# Patient Record
Sex: FEMALE | Race: OTHER | Employment: UNEMPLOYED | URBAN - METROPOLITAN AREA
[De-identification: names, ages, dates, MRNs, and addresses within clinical notes are randomized per-mention and may not be internally consistent; named-entity substitution may affect disease eponyms.]

---

## 2020-11-11 ENCOUNTER — HOSPITAL ENCOUNTER (INPATIENT)
Age: 20
LOS: 1 days | Discharge: HOME | DRG: 885 | End: 2020-11-12
Attending: EMERGENCY MEDICINE | Admitting: PSYCHIATRY & NEUROLOGY
Payer: COMMERCIAL

## 2020-11-11 PROBLEM — F33.9 MAJOR DEPRESSIVE DISORDER, RECURRENT (HCC): Status: ACTIVE | Noted: 2020-11-11

## 2020-11-11 LAB
ACETAMINOPHEN LEVEL: <5 MCG/ML (ref 10–30)
ALBUMIN SERPL-MCNC: 4.1 G/DL (ref 3.5–5.2)
ALP BLD-CCNC: 89 U/L (ref 35–104)
ALT SERPL-CCNC: 16 U/L (ref 0–32)
AMPHETAMINE SCREEN, URINE: NOT DETECTED
ANION GAP SERPL CALCULATED.3IONS-SCNC: 7 MMOL/L (ref 7–16)
AST SERPL-CCNC: 16 U/L (ref 0–31)
BARBITURATE SCREEN URINE: NOT DETECTED
BASOPHILS ABSOLUTE: 0.04 E9/L (ref 0–0.2)
BASOPHILS RELATIVE PERCENT: 0.4 % (ref 0–2)
BENZODIAZEPINE SCREEN, URINE: NOT DETECTED
BILIRUB SERPL-MCNC: 0.4 MG/DL (ref 0–1.2)
BILIRUBIN URINE: NEGATIVE
BLOOD, URINE: NEGATIVE
BUN BLDV-MCNC: 15 MG/DL (ref 6–20)
CALCIUM SERPL-MCNC: 9.7 MG/DL (ref 8.6–10.2)
CANNABINOID SCREEN URINE: NOT DETECTED
CHLORIDE BLD-SCNC: 103 MMOL/L (ref 98–107)
CLARITY: CLEAR
CO2: 26 MMOL/L (ref 22–29)
COCAINE METABOLITE SCREEN URINE: NOT DETECTED
COLOR: YELLOW
CREAT SERPL-MCNC: 0.7 MG/DL (ref 0.5–1)
EOSINOPHILS ABSOLUTE: 0.35 E9/L (ref 0.05–0.5)
EOSINOPHILS RELATIVE PERCENT: 3.2 % (ref 0–6)
ETHANOL: <10 MG/DL (ref 0–0.08)
FENTANYL SCREEN, URINE: NOT DETECTED
GFR AFRICAN AMERICAN: >60
GFR NON-AFRICAN AMERICAN: >60 ML/MIN/1.73
GLUCOSE BLD-MCNC: 89 MG/DL (ref 74–99)
GLUCOSE URINE: NEGATIVE MG/DL
HCG(URINE) PREGNANCY TEST: NEGATIVE
HCT VFR BLD CALC: 38.3 % (ref 34–48)
HEMOGLOBIN: 12.3 G/DL (ref 11.5–15.5)
IMMATURE GRANULOCYTES #: 0.03 E9/L
IMMATURE GRANULOCYTES %: 0.3 % (ref 0–5)
KETONES, URINE: NEGATIVE MG/DL
LEUKOCYTE ESTERASE, URINE: NEGATIVE
LYMPHOCYTES ABSOLUTE: 1.75 E9/L (ref 1.5–4)
LYMPHOCYTES RELATIVE PERCENT: 16 % (ref 20–42)
Lab: NORMAL
MCH RBC QN AUTO: 28 PG (ref 26–35)
MCHC RBC AUTO-ENTMCNC: 32.1 % (ref 32–34.5)
MCV RBC AUTO: 87.2 FL (ref 80–99.9)
METHADONE SCREEN, URINE: NOT DETECTED
MONOCYTES ABSOLUTE: 0.94 E9/L (ref 0.1–0.95)
MONOCYTES RELATIVE PERCENT: 8.6 % (ref 2–12)
NEUTROPHILS ABSOLUTE: 7.84 E9/L (ref 1.8–7.3)
NEUTROPHILS RELATIVE PERCENT: 71.5 % (ref 43–80)
NITRITE, URINE: NEGATIVE
OPIATE SCREEN URINE: NOT DETECTED
OXYCODONE URINE: NOT DETECTED
PDW BLD-RTO: 12.9 FL (ref 11.5–15)
PH UA: 7.5 (ref 5–9)
PHENCYCLIDINE SCREEN URINE: NOT DETECTED
PLATELET # BLD: 294 E9/L (ref 130–450)
PMV BLD AUTO: 9.8 FL (ref 7–12)
POTASSIUM SERPL-SCNC: 4.3 MMOL/L (ref 3.5–5)
PROTEIN UA: NEGATIVE MG/DL
RBC # BLD: 4.39 E12/L (ref 3.5–5.5)
SALICYLATE, SERUM: <0.3 MG/DL (ref 0–30)
SARS-COV-2, NAAT: NOT DETECTED
SODIUM BLD-SCNC: 136 MMOL/L (ref 132–146)
SPECIFIC GRAVITY UA: 1.02 (ref 1–1.03)
T4 TOTAL: 5.8 MCG/DL (ref 4.5–11.7)
TOTAL PROTEIN: 7.9 G/DL (ref 6.4–8.3)
TRICYCLIC ANTIDEPRESSANTS SCREEN SERUM: NEGATIVE NG/ML
TSH SERPL DL<=0.05 MIU/L-ACNC: 4.3 UIU/ML (ref 0.27–4.2)
UROBILINOGEN, URINE: 0.2 E.U./DL
WBC # BLD: 11 E9/L (ref 4.5–11.5)

## 2020-11-11 PROCEDURE — 84436 ASSAY OF TOTAL THYROXINE: CPT

## 2020-11-11 PROCEDURE — 80307 DRUG TEST PRSMV CHEM ANLYZR: CPT

## 2020-11-11 PROCEDURE — G0480 DRUG TEST DEF 1-7 CLASSES: HCPCS

## 2020-11-11 PROCEDURE — 80053 COMPREHEN METABOLIC PANEL: CPT

## 2020-11-11 PROCEDURE — 84443 ASSAY THYROID STIM HORMONE: CPT

## 2020-11-11 PROCEDURE — 1240000000 HC EMOTIONAL WELLNESS R&B

## 2020-11-11 PROCEDURE — 99284 EMERGENCY DEPT VISIT MOD MDM: CPT

## 2020-11-11 PROCEDURE — 81003 URINALYSIS AUTO W/O SCOPE: CPT

## 2020-11-11 PROCEDURE — 6370000000 HC RX 637 (ALT 250 FOR IP): Performed by: PHYSICIAN ASSISTANT

## 2020-11-11 PROCEDURE — 85025 COMPLETE CBC W/AUTO DIFF WBC: CPT

## 2020-11-11 PROCEDURE — 81025 URINE PREGNANCY TEST: CPT

## 2020-11-11 PROCEDURE — U0002 COVID-19 LAB TEST NON-CDC: HCPCS

## 2020-11-11 RX ORDER — TRAZODONE HYDROCHLORIDE 50 MG/1
50 TABLET ORAL NIGHTLY PRN
Status: DISCONTINUED | OUTPATIENT
Start: 2020-11-11 | End: 2020-11-12 | Stop reason: HOSPADM

## 2020-11-11 RX ORDER — LORAZEPAM 1 MG/1
2 TABLET ORAL ONCE
Status: COMPLETED | OUTPATIENT
Start: 2020-11-11 | End: 2020-11-11

## 2020-11-11 RX ORDER — SUCRALFATE 1 G/1
1 TABLET ORAL
COMMUNITY

## 2020-11-11 RX ORDER — HALOPERIDOL 5 MG
5 TABLET ORAL EVERY 6 HOURS PRN
Status: DISCONTINUED | OUTPATIENT
Start: 2020-11-11 | End: 2020-11-12 | Stop reason: HOSPADM

## 2020-11-11 RX ORDER — HALOPERIDOL 5 MG/ML
5 INJECTION INTRAMUSCULAR EVERY 6 HOURS PRN
Status: DISCONTINUED | OUTPATIENT
Start: 2020-11-11 | End: 2020-11-12 | Stop reason: HOSPADM

## 2020-11-11 RX ORDER — BENZTROPINE MESYLATE 0.5 MG/1
0.5 TABLET ORAL DAILY
COMMUNITY

## 2020-11-11 RX ORDER — TRAZODONE HYDROCHLORIDE 50 MG/1
50 TABLET ORAL NIGHTLY
Status: ON HOLD | COMMUNITY
End: 2020-11-11

## 2020-11-11 RX ORDER — ACETAMINOPHEN 325 MG/1
650 TABLET ORAL EVERY 6 HOURS PRN
Status: DISCONTINUED | OUTPATIENT
Start: 2020-11-11 | End: 2020-11-12 | Stop reason: HOSPADM

## 2020-11-11 RX ORDER — LORAZEPAM 1 MG/1
1 TABLET ORAL ONCE
Status: DISCONTINUED | OUTPATIENT
Start: 2020-11-11 | End: 2020-11-11

## 2020-11-11 RX ORDER — LITHIUM CARBONATE 300 MG/1
300 TABLET, FILM COATED, EXTENDED RELEASE ORAL DAILY
Status: ON HOLD | COMMUNITY
End: 2020-11-12 | Stop reason: HOSPADM

## 2020-11-11 RX ORDER — M-VIT,TX,IRON,MINS/CALC/FOLIC 27MG-0.4MG
1 TABLET ORAL DAILY
COMMUNITY

## 2020-11-11 RX ORDER — PRAZOSIN HYDROCHLORIDE 2 MG/1
2 CAPSULE ORAL DAILY
Status: ON HOLD | COMMUNITY
End: 2020-11-11

## 2020-11-11 RX ORDER — MAGNESIUM HYDROXIDE/ALUMINUM HYDROXICE/SIMETHICONE 120; 1200; 1200 MG/30ML; MG/30ML; MG/30ML
30 SUSPENSION ORAL PRN
Status: DISCONTINUED | OUTPATIENT
Start: 2020-11-11 | End: 2020-11-12 | Stop reason: HOSPADM

## 2020-11-11 RX ORDER — LITHIUM CARBONATE 450 MG
450 TABLET, EXTENDED RELEASE ORAL NIGHTLY
Status: ON HOLD | COMMUNITY
End: 2020-11-11

## 2020-11-11 RX ORDER — PHENOL 1.4 %
10 AEROSOL, SPRAY (ML) MUCOUS MEMBRANE NIGHTLY PRN
COMMUNITY

## 2020-11-11 RX ORDER — BUSPIRONE HYDROCHLORIDE 10 MG/1
10 TABLET ORAL 3 TIMES DAILY
COMMUNITY

## 2020-11-11 RX ORDER — HYDROXYZINE PAMOATE 50 MG/1
50 CAPSULE ORAL 3 TIMES DAILY PRN
Status: DISCONTINUED | OUTPATIENT
Start: 2020-11-11 | End: 2020-11-12 | Stop reason: HOSPADM

## 2020-11-11 RX ADMIN — LORAZEPAM 2 MG: 1 TABLET ORAL at 12:38

## 2020-11-11 ASSESSMENT — SLEEP AND FATIGUE QUESTIONNAIRES
AVERAGE NUMBER OF SLEEP HOURS: 5
DIFFICULTY ARISING: NO
RESTFUL SLEEP: NO
DO YOU USE A SLEEP AID: YES
DIFFICULTY STAYING ASLEEP: YES
SLEEP PATTERN: DIFFICULTY FALLING ASLEEP
DO YOU HAVE DIFFICULTY SLEEPING: YES
DIFFICULTY FALLING ASLEEP: YES

## 2020-11-11 ASSESSMENT — PAIN SCALES - GENERAL: PAINLEVEL_OUTOF10: 0

## 2020-11-11 ASSESSMENT — LIFESTYLE VARIABLES: HISTORY_ALCOHOL_USE: NO

## 2020-11-11 NOTE — ED NOTES
Bed: [facility]  Expected date:   Expected time:   Means of arrival:   Comments:  Geovany Carson RN  11/11/20 75

## 2020-11-11 NOTE — ED NOTES
Emergency Department [facility] AN AFFILIATE OF [facility] Biopsychosocial Assessment Note    Chief Complaint: +SI, -HI, denies plan    MSE: Pt alert & oriented x3. Pt is cooperative. Pt speech is good, eye contact is good. Pt thoughts are unstable due to commanding auditory hallucinations but she communicates well. Pt mood is anxious, affect congruent. Pt insight/judgement is fair. Pt reports SI with plan. Pt reports a/v commanding hallucinations. Pt denies HI. Clinical Summary/History: pt reports feeling as if she had a manic episode this morning- pt reports freaking out, hyperventilating, and crying. Pt reports SI with plan to either overdose on heroin, hang herself, or shoot herself. Pt reports plan is to use anything accessible. Pt reports several stressors- fighting federal cases and possibly having a warrant out for her arrest. Pt reports being threatened to be jumped at [facility]. Pt has lived at [facility] for 30 days. Pt reports extensive mental health hx. Pt reports diagnosis of PTSD, bipolar, schizophrenia, depression, and anxiety. Pt reports taking medications for these as prescribed. Pt reports nurse practitioner at [facility]- does not know her name. Pt reports hx of hospitalization. Pt reports most recent hospitalization at [facility] 30 days ago for SI- mom stopped her. Pt reports hx of SI with attempts. Pt reports SI since she was 8. Pt reports feelings of helplessness/hopelessness. Pt reports hx of self-injurious behavior of cutting-last incident was four years ago. Pt reports commanding a/v hallucinations. Pt reports auditory hallucinations telling her to kill herself. Pt reports hallucinations do not tell her to harm anyone other than herself. Pt reports past d&a use. Pt is 52 days sober. Pt reports struggles with addiction of methamphetamine. Pt reports current treatment at [facility]- has been there for the past 30 days. Pt denies alcohol use. Pt reports good sleep and average appetite. Gender  [] Male [x] Female [] Transgender  [] Other    Sexual Orientation    [x] Heterosexual [] Homosexual [] Bisexual [] Other    Suicidal Behavioral: CSSR-S Complete. [x] Reports:    [x] Past [x] Present   [] Denies    Homicidal/ Violent Behavior  [] Reports:   [] Past [] Present   [x] Denies     Hallucinations/Delusions   [x] Reports: a/v hallucinations  [] Denies     Substance Use/Alcohol Use/Addiction: SBIRT Screen Complete. [x] Reports:   [] Denies     Trauma History  [] Reports:  [x] Denies     Collateral Information:   Pt is pink slipped by ED  Per pink slip, the patient presents today feeling suicidal. She does have a plan to buy heroin and overdose when she is discharged from [facility] in 5 days time. Level of Care/Disposition Plan  [] Home:   [] Outpatient Provider:   [] Crisis Unit:   [x] Inpatient Psychiatric Unit:  [] Other: This note will be reviewed by shift .      PITA Melgar LSW  MSW Intern     Ellie Robles  11/11/20 [facility] & [address] [place]  11/11/20 1126

## 2020-11-11 NOTE — ED NOTES
Pt anxious and pacing in unit back and forth she reports to this Rn that she wants to leave and needs a cigarette. Pt able to be redirected but anxious and agitated. Denies any harmful thoughts to anyone or herself at this time. Doug STALLINGS notified and explained that pt will need meds to help pt relax.        Heaven Prabhaakr RN  11/11/20 2373

## 2020-11-11 NOTE — ED NOTES
Spoke to Ramya Np regarding  pt status and she is ok with pt being admitted to [facility] unit     Umer Patiño RN  11/11/20 3079

## 2020-11-11 NOTE — ED NOTES
Assigned [phone] to [facility] in admitting     Gerson Rivera [place]  11/11/20 1149

## 2020-11-11 NOTE — ED PROVIDER NOTES
ED Attending  CC: Aleida     Department of Emergency Medicine   ED  Provider Note  Admit Date/RoomTime: 11/11/2020  9:53 AM  ED Room: [address] [place] [address]  Chief Complaint   Psychiatric Evaluation (+SI, -HI, denies plan)    History of Present Illness   Source of history provided by:  patient. History/Exam Limitations: none. Rema Pierce is a 23 y.o. old female who has a past medical history of: No past medical history on file. presents to the emergency department by private vehicle, for suicidal ideations. Patient states that she has been feeling suicidal for a few weeks now. She is currently in [facility] for methamphetamine abuse. She is currently 52 days sober. She states that she is due to be released in 5 days time. She states that she has been feeling more suicidal but states that [facility] has not been addressing this for her. She states that she has a plan to overdose on heroin when she is released from [facility] in a few days. She denies any homicidal ideations. She states that she does hear voices occasionally, states that this is normal with her schizophrenia. They are not telling her to do anything or harm herself at this point. She denies any visual hallucinations. Nothing seemed to make her symptoms better or worse. ROS    Pertinent positives and negatives are stated within HPI, all other systems reviewed and are negative. Past Surgical History:  has no past surgical history on file. Social History:  reports that she has been smoking cigarettes. She has been smoking about 1.00 pack per day. She does not have any smokeless tobacco history on file. Family History: family history is not on file. Allergies: Patient has no known allergies.     Physical Exam           ED Triage Vitals [11/11/20 0955]   BP Temp Temp Source Pulse Resp SpO2 Height Weight   125/87 98.2 °F (36.8 °C) Oral 95 20 98 % -- --      Oxygen Saturation Interpretation: Normal.    General Appearance: well-appearing. Constitutional:   Level of Consciousness: Awake and alert. ETOH: No.          Distress: none. Cooperativeness: cooperative. Eyes:  PERRL, EOMI, no discharge or conjunctival injection. Ears:  External ears without lesions. Throat:  Pharynx without injection, exudate, or tonsillar hypertrophy. Airway patient. Neck:  Normal ROM. Supple. Respiratory:  Clear to auscultation and breath sounds equal.  CV:  Regular rate and rhythm, normal heart sounds, without pathological murmurs, ectopy, gallops, or rubs. GI:  Abdomen Soft, nontender, good bowel sounds. No firm or pulsatile mass. Back:  No costovertebral tenderness. Integument:  Normal turgor. Warm, dry, without visible rash, unless noted elsewhere. Lymphatics: No lymphangitis or adenopathy noted. Neurological:  Oriented. Motor functions intact. Psychiatric:        Thought Process:       Coherent:  Yes. Delusions / Paranoia: no evidence of paranoia. Flight of ideas:  No.         Rambling conversation:  No.       Affect: calm. Suicidal ideation:  suicidal ideation with clear plan and intent. Homicidal ideation:  no homicidal ideation. Perceptions:  denies any perceptual disturbance present. Insight: below average. Judgement: below average.     Lab / Imaging Results   (All laboratory and radiology results have been personally reviewed by myself)  Labs:  Results for orders placed or performed during the hospital encounter of 11/11/20   Urinalysis   Result Value Ref Range    Color, UA Yellow Straw/Yellow    Clarity, UA Clear Clear    Glucose, Ur Negative Negative mg/dL    Bilirubin Urine Negative Negative    Ketones, Urine Negative Negative mg/dL    Specific Gravity, UA 1.020 1.005 - 1.030    Blood, Urine Negative Negative    pH, UA 7.5 5.0 - 9.0    Protein, UA Negative Negative mg/dL    Urobilinogen, Urine 0.2 <2.0 E.U./dL    Nitrite, Urine Negative Negative    Leukocyte Esterase, Urine Negative Negative   CBC Auto Differential   Result Value Ref Range    WBC 11.0 4.5 - 11.5 E9/L    RBC 4.39 3.50 - 5.50 E12/L    Hemoglobin 12.3 11.5 - 15.5 g/dL    Hematocrit 38.3 34.0 - 48.0 %    MCV 87.2 80.0 - 99.9 fL    MCH 28.0 26.0 - 35.0 pg    MCHC 32.1 32.0 - 34.5 %    RDW 12.9 11.5 - 15.0 fL    Platelets 220 058 - 509 E9/L    MPV 9.8 7.0 - 12.0 fL    Neutrophils % 71.5 43.0 - 80.0 %    Immature Granulocytes % 0.3 0.0 - 5.0 %    Lymphocytes % 16.0 (L) 20.0 - 42.0 %    Monocytes % 8.6 2.0 - 12.0 %    Eosinophils % 3.2 0.0 - 6.0 %    Basophils % 0.4 0.0 - 2.0 %    Neutrophils Absolute 7.84 (H) 1.80 - 7.30 E9/L    Immature Granulocytes # 0.03 E9/L    Lymphocytes Absolute 1.75 1.50 - 4.00 E9/L    Monocytes Absolute 0.94 0.10 - 0.95 E9/L    Eosinophils Absolute 0.35 0.05 - 0.50 E9/L    Basophils Absolute 0.04 0.00 - 0.20 E9/L   Comprehensive Metabolic Panel   Result Value Ref Range    Sodium 136 132 - 146 mmol/L    Potassium 4.3 3.5 - 5.0 mmol/L    Chloride 103 98 - 107 mmol/L    CO2 26 22 - 29 mmol/L    Anion Gap 7 7 - 16 mmol/L    Glucose 89 74 - 99 mg/dL    BUN 15 6 - 20 mg/dL    CREATININE 0.7 0.5 - 1.0 mg/dL    GFR Non-African American >60 >=60 mL/min/1.73    GFR African American >60     Calcium 9.7 8.6 - 10.2 mg/dL    Total Protein 7.9 6.4 - 8.3 g/dL    Alb 4.1 3.5 - 5.2 g/dL    Total Bilirubin 0.4 0.0 - 1.2 mg/dL    Alkaline Phosphatase 89 35 - 104 U/L    ALT 16 0 - 32 U/L    AST 16 0 - 31 U/L   TSH without Reflex   Result Value Ref Range    TSH 4.300 (H) 0.270 - 4.200 uIU/mL   T4   Result Value Ref Range    T4, Total 5.8 4.5 - 11.7 mcg/dL   Urine Drug Screen   Result Value Ref Range    Amphetamine Screen, Urine NOT DETECTED Negative <1000 ng/mL    Barbiturate Screen, Ur NOT DETECTED Negative < 200 ng/mL    Benzodiazepine Screen, Urine NOT DETECTED Negative < 200 ng/mL    Cannabinoid Scrn, Ur NOT DETECTED Negative < 50ng/mL    Cocaine Metabolite Screen, Urine NOT DETECTED Negative < 300 ng/mL    Opiate Scrn, Ur NOT DETECTED Negative < 300ng/mL    PCP Screen, Urine NOT DETECTED Negative < 25 ng/mL    Methadone Screen, Urine NOT DETECTED Negative <300 ng/mL    Oxycodone Urine NOT DETECTED Negative <100 ng/mL    FENTANYL SCREEN, URINE NOT DETECTED Negative <1 ng/mL    Drug Screen Comment: see below    Serum Drug Screen   Result Value Ref Range    Ethanol Lvl <10 mg/dL    Acetaminophen Level <5.0 (L) 10.0 - 43.9 mcg/mL    Salicylate, Serum <2.7 0.0 - 30.0 mg/dL    TCA Scrn NEGATIVE Cutoff:300 ng/mL   Pregnancy, urine   Result Value Ref Range    HCG(Urine) Pregnancy Test NEGATIVE NEGATIVE     Imaging: All Radiology results interpreted by Radiologist unless otherwise noted. No orders to display     ED Course / Medical Decision Making     Medications   LORazepam (ATIVAN) tablet 2 mg (2 mg Oral Given 11/11/20 1238)        Re-examination:  11/11/20       Time: 1125   Patient is medically cleared and pink slipped. Consult(s):   IP CONSULT TO SOCIAL WORK    Procedure(s):   none    MDM:   Patient to be admitted per psych. Counseling: The emergency provider has spoken with the patient and discussed todays results, in addition to providing specific details for the plan of care and counseling regarding the diagnosis and prognosis. Questions are answered at this time and they are agreeable with the plan. Assessment      1. Suicidal ideations      Plan   Admit to psych  Patient condition is good    New Medications     New Prescriptions    No medications on file     Electronically signed by HAYLIE Alexandre   DD: 11/11/20  **This report was transcribed using voice recognition software. Every effort was made to ensure accuracy; however, inadvertent computerized transcription errors may be present.   END OF ED PROVIDER NOTE       Morena Alexandre  11/11/20 9704

## 2020-11-12 VITALS
OXYGEN SATURATION: 98 % | RESPIRATION RATE: 14 BRPM | HEIGHT: 61 IN | TEMPERATURE: 98.3 F | DIASTOLIC BLOOD PRESSURE: 77 MMHG | BODY MASS INDEX: 33.99 KG/M2 | WEIGHT: 180 LBS | SYSTOLIC BLOOD PRESSURE: 144 MMHG | HEART RATE: 77 BPM

## 2020-11-12 PROBLEM — F31.9 BIPOLAR DISORDER (HCC): Status: ACTIVE | Noted: 2020-11-12

## 2020-11-12 PROCEDURE — 99221 1ST HOSP IP/OBS SF/LOW 40: CPT | Performed by: NURSE PRACTITIONER

## 2020-11-12 PROCEDURE — 6370000000 HC RX 637 (ALT 250 FOR IP): Performed by: NURSE PRACTITIONER

## 2020-11-12 PROCEDURE — 6370000000 HC RX 637 (ALT 250 FOR IP): Performed by: PSYCHIATRY & NEUROLOGY

## 2020-11-12 RX ORDER — LITHIUM CARBONATE 450 MG
450 TABLET, EXTENDED RELEASE ORAL NIGHTLY
Status: DISCONTINUED | OUTPATIENT
Start: 2020-11-12 | End: 2020-11-12 | Stop reason: HOSPADM

## 2020-11-12 RX ORDER — LITHIUM CARBONATE 300 MG/1
300 TABLET, FILM COATED, EXTENDED RELEASE ORAL DAILY
Qty: 30 TABLET | Refills: 0
Start: 2020-11-12 | End: 2020-12-12

## 2020-11-12 RX ORDER — TRAZODONE HYDROCHLORIDE 50 MG/1
50 TABLET ORAL NIGHTLY
Status: DISCONTINUED | OUTPATIENT
Start: 2020-11-12 | End: 2020-11-12 | Stop reason: HOSPADM

## 2020-11-12 RX ORDER — LITHIUM CARBONATE 450 MG
450 TABLET, EXTENDED RELEASE ORAL NIGHTLY
Qty: 30 TABLET | Refills: 0
Start: 2020-11-12 | End: 2020-12-12

## 2020-11-12 RX ORDER — PRAZOSIN HYDROCHLORIDE 2 MG/1
2 CAPSULE ORAL DAILY
Status: DISCONTINUED | OUTPATIENT
Start: 2020-11-12 | End: 2020-11-12 | Stop reason: HOSPADM

## 2020-11-12 RX ORDER — PRAZOSIN HYDROCHLORIDE 2 MG/1
2 CAPSULE ORAL DAILY
Qty: 30 CAPSULE | Refills: 0
Start: 2020-11-12 | End: 2020-12-12

## 2020-11-12 RX ORDER — LITHIUM CARBONATE 300 MG/1
300 TABLET, FILM COATED, EXTENDED RELEASE ORAL DAILY
Status: DISCONTINUED | OUTPATIENT
Start: 2020-11-12 | End: 2020-11-12 | Stop reason: HOSPADM

## 2020-11-12 RX ORDER — BENZTROPINE MESYLATE 0.5 MG/1
0.5 TABLET ORAL DAILY
Status: DISCONTINUED | OUTPATIENT
Start: 2020-11-12 | End: 2020-11-12 | Stop reason: HOSPADM

## 2020-11-12 RX ADMIN — LITHIUM CARBONATE 300 MG: 300 TABLET, FILM COATED, EXTENDED RELEASE ORAL at 13:25

## 2020-11-12 RX ADMIN — BENZTROPINE MESYLATE 0.5 MG: 0.5 TABLET ORAL at 13:25

## 2020-11-12 RX ADMIN — PRAZOSIN HYDROCHLORIDE 2 MG: 2 CAPSULE ORAL at 13:25

## 2020-11-12 RX ADMIN — NICOTINE POLACRILEX 4 MG: 2 GUM, CHEWING BUCCAL at 10:39

## 2020-11-12 ASSESSMENT — SLEEP AND FATIGUE QUESTIONNAIRES
DIFFICULTY ARISING: NO
DO YOU USE A SLEEP AID: NO
DIFFICULTY STAYING ASLEEP: NO
RESTFUL SLEEP: YES
DIFFICULTY FALLING ASLEEP: NO
AVERAGE NUMBER OF SLEEP HOURS: 9

## 2020-11-12 ASSESSMENT — PAIN SCALES - GENERAL: PAINLEVEL_OUTOF10: 0

## 2020-11-12 ASSESSMENT — LIFESTYLE VARIABLES: HISTORY_ALCOHOL_USE: NO

## 2020-11-12 NOTE — CARE COORDINATION
Damaso called Alphonse Angelucci at [facility] to ensure the pt is able to return. Damaso received VM. Will try again at a later time.

## 2020-11-12 NOTE — GROUP NOTE
Group Therapy Note    Date: 11/12/2020    Group Start Time: 1000  Group End Time: 1499  Group Topic: Psychoeducation    [facility]    Brittney Álvarez, CTRS        Group Therapy Note      Number of participants: 10  Type of group: Psychoeducation  Mode of intervention: Education, Support, Socialization, Exploration, Clarifying, and Problem-solving  Topic: Dimensions of Wellness  Objective: Pt will identify 1 dimension of wellness to work on in recovery. Patient's Goal:  \"Get back to rehab\"     Notes:  Pt was interactive during group sharing 1 dimension of wellness to work on in recovery. Pt gave support and feedback to others. Status After Intervention:  Improved    Participation Level:  Active Listener and Interactive    Participation Quality: Appropriate, Attentive, Sharing and Supportive      Speech:  normal      Thought Process/Content: Logical      Affective Functioning: Congruent      Mood: euthymic      Level of consciousness:  Alert, Oriented x4 and Attentive      Response to Learning: Able to verbalize current knowledge/experience      Endings: None Reported    Modes of Intervention: Education, Support, Socialization, Exploration, Clarifying and Problem-solving

## 2020-11-12 NOTE — PLAN OF CARE
Problem: Depressive Behavior With or Without Suicide Precautions:  Goal: Able to verbalize acceptance of life and situations over which he or she has no control  Description: Able to verbalize acceptance of life and situations over which he or she has no control  11/12/2020 0818 by Delfino Jordan RN  Outcome: Ongoing  11/12/2020 0239 by Nata Gardner RN  Outcome: Ongoing  Goal: Able to verbalize and/or display a decrease in depressive symptoms  Description: Able to verbalize and/or display a decrease in depressive symptoms  11/12/2020 0818 by Delfino Jordan RN  Outcome: Ongoing  11/12/2020 0239 by Nata Gardner RN  Outcome: Ongoing  Goal: Ability to disclose and discuss suicidal ideas will improve  Description: Ability to disclose and discuss suicidal ideas will improve  11/12/2020 0818 by Delfino Jordan RN  Outcome: Ongoing  11/12/2020 0239 by Nata Gardner RN  Outcome: Ongoing  Goal: Able to verbalize support systems  Description: Able to verbalize support systems  11/12/2020 0818 by Delfino Jordan RN  Outcome: Ongoing  11/12/2020 0239 by Nata Gardner RN  Outcome: Ongoing  Goal: Absence of self-harm  Description: Absence of self-harm  11/12/2020 0818 by Delfino Jordan RN  Outcome: Ongoing  11/12/2020 0239 by Nata Gardner RN  Outcome: Met This Shift  Goal: Patient specific goal  Description: Patient specific goal  11/12/2020 0818 by Delfino Jordan RN  Outcome: Ongoing  11/12/2020 0239 by Nata Gardner RN  Outcome: Ongoing  Goal: Participates in care planning  Description: Participates in care planning  11/12/2020 0818 by Delfino Jordan RN  Outcome: Ongoing  11/12/2020 0239 by Nata Gardner RN  Outcome: Ongoing

## 2020-11-12 NOTE — CARE COORDINATION
Pt is able to return to [facility]. Facility is willing to accept pt back today. Damaso wrote this pt a taxi voucher. Pt's nurse will be notified. NP will be notified.

## 2020-11-12 NOTE — CARE COORDINATION
In order to ensure appropriate transition and discharge planning is in place, the following documents have been transmitted Janit Prime, as the new outpatient provider:     · The d/c diagnosis was transmitted to the next care provider  · The reason for hospitalization was transmitted to the next care provider  · The d/c medications (dosage and indication) were transmitted to the next care provider   · The continuing care plan was transmitted to the next care provider

## 2020-11-12 NOTE — PROGRESS NOTES
[facility]  Initial Interdisciplinary Treatment Plan NOTE    Review Date & Time: 11/12/2020 1000    Patient was in treatment team    Admission Type:   Admission Type: Involuntary    Reason for admission:  Reason for Admission: \"i had a bipolar episode, and said i was suicidle\"      Estimated Length of Stay Update:  3-5 days  Estimated Discharge Date Update: 11/15/2020    PATIENT STRENGTHS:  Patient Strengths Strengths: Medication Compliance, Positive Support, No significant Physical Illness, Communication  Patient Strengths and Limitations:Limitations: Difficult relationships / poor social skills, Inappropriate/potentially harmful leisure interests  Addictive Behavior:Addictive Behavior  In the past 3 months, have you felt or has someone told you that you have a problem with:  : None  Do you have a history of Chemical Use?: Yes  Do you have a history of Alcohol Use?: No  Do you have a history of Street Drug Abuse?: Yes  Histroy of Prescripton Drug Abuse?: No  Medical Problems:No past medical history on file.     EDUCATION:   Learner Progress Toward Treatment Goals: Reviewed group plan and strategies    Method: Small group    Outcome: Verbalized understanding    PATIENT GOALS: medication compliance and group therapy attendance    PLAN/TREATMENT RECOMMENDATIONS UPDATE:medication compliance and group therapy attendance    GOALS UPDATE:   Time frame for Short-Term Goals: 3-5 days    Stacy Mcfarland RN
Patient is bright and pleasant upon approach, states \"I only have 4 days left at rehab, I have been 52 days clean\". Patient ate 100% breakfast and showered, denies thoughts to harm self or others. Patient denies hallucinations. Patient states \"I just had a manic episode after I got some bad news yesterday, I feel fine today\". Patient is social with peers and staff. Patient attends groups.  Emotional support given
Recreation assessment completed.
Spiritual Support Group Note    Number of Participants in Group:      7                  Time:     1:15    Goal: Relief from isolation and loneliness             Bernarda Sharing             Self-understanding and gain insight              Acceptance and belonging            Recognize they are not alone                Socialization             Empowerment       Encouragement    Topic:  [x] Spiritual Wellness and Self Care                  [] Hope                     [] Connecting with Divine/Others        [] Thankfulness and Gratitude               [x]  Meaningfulness and Purpose               [] Forgiveness               [x] Peace               [] Connect to [employer]      [] Other    Participation Level:   [x] Active Listener   [] Minimal   [] Monopolizing   [] Interactive   [] No Participation   []  Other:     Attention:   [x] Alert   [] Distractible   [] Drowsy   [] Poor   [] Other:    Manner:   [x] Cooperative   [] Suspicious   [] Withdrawn   [] Guarded   [] Irritable   [] Inhospitable   [] Other:     Others Comments from Group:
counseling faxed to [facility]                                                          (x ) Patient refused counseling  ( ) Patient has not smoked in the last 30 days    Metabolic Screening:    No results found for: LABA1C    No results found for: CHOL  No results found for: TRIG  No results found for: HDL  No components found for: LDLCAL  No results found for: LABVLDL      Body mass index is 34.01 kg/m². BP Readings from Last 2 Encounters:   11/11/20 118/70   07/16/17 138/83           Pt admitted with followings belongings:        Valuables put into locker. Patient oriented to surroundings and program expectations and copy of patient rights given. Received admission packet:  yes. Consents reviewed, signed yes. Patient verbalize understanding:  yes.     Patient education on precautions: yes                   Chele Harvey RN

## 2020-11-12 NOTE — GROUP NOTE
Group Therapy Note    Date: 11/12/2020    Group Start Time: 1120  Group End Time: 1200  Group Topic: Cognitive Skills    [facility]    FLACO Pratt        Group Therapy Note    Attendees: 10         Patient's Goal:  Pt will be able to identify positive anger management skills. Notes:  Pt was an active participant in class. He made positive connections with others. Status After Intervention:  Improved    Participation Level:  Active Listener and Interactive    Participation Quality: Appropriate, Attentive, Sharing and Supportive      Speech:  normal      Thought Process/Content: Logical      Affective Functioning: Blunted      Mood: depressed      Level of consciousness:  Alert, Oriented x4 and Attentive      Response to Learning: Able to verbalize current knowledge/experience, Able to verbalize/acknowledge new learning and Progressing to goal      Endings: None Reported    Modes of Intervention: Education, Support, Socialization and Problem-solving      Discipline Responsible: /Counselor      Signature:  FLACO Mcleod
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
## 2020-11-12 NOTE — SUICIDE SAFETY PLAN
SAFETY PLAN    A suicide Safety Plan is a document that supports someone when they are having thoughts of suicide. Warning Signs that indicate a suicidal crisis may be developing: What (situations, thoughts, feelings, body sensations, behaviors, etc.) do you experience that lets you know you are beginning to think about suicide? 1. stress  2. crying  3. pacing    Internal Coping Strategies:  What things can I do (relaxation techniques, hobbies, physical activities, etc.) to take my mind off my problems without contacting another person? 1. draw  2. write  3. walk    People and social settings that provide distraction: Who can I call or where can I go to distract me? 1. Name: Angela Hewitt  Phone: none  2. Name: mom  Phone: [phone]   5. Place: nature            4. Place: my truck    People whom I can ask for help: Who can I call when I need help - for example, friends, family, clergy, someone else? 1. Name: mom                Phone: [phone]  2. Name: dad  Phone: none  3. Name: Mariangel Fernando  Phone: none    Professionals or [facility] agencies I can contact during a crisis: Who can I call for help - for example, my doctor, my psychiatrist, my psychologist, a mental health provider, a suicide hotline? 1. Clinician Name: [facility] professional services   Phone:        3. Suicide Prevention Lifeline: 8-221-852-TALK (7986)    4. [address] [place] [address] Emergency Services -  for example, [facility]: 911      Emergency Services Address:       Emergency Services Phone: [phone]    Making the environment safe: How can I make my environment (house/apartment/living space) safer? For example, can I remove guns, medications, and other items? 1. No guns  2.  No pills

## 2020-11-12 NOTE — PLAN OF CARE
Problem: Depressive Behavior With or Without Suicide Precautions:  Goal: Able to verbalize acceptance of life and situations over which he or she has no control  Description: Able to verbalize acceptance of life and situations over which he or she has no control  11/12/2020 1353 by Bret Phillips RN  Outcome: Completed  11/12/2020 0818 by Bret Phillips RN  Outcome: Ongoing  11/12/2020 0239 by Ruth Lizama RN  Outcome: Ongoing  Goal: Able to verbalize and/or display a decrease in depressive symptoms  Description: Able to verbalize and/or display a decrease in depressive symptoms  11/12/2020 1353 by Bret Phillips RN  Outcome: Completed  11/12/2020 0818 by Bret Phillips RN  Outcome: Ongoing  11/12/2020 0239 by Ruth Lizama RN  Outcome: Ongoing  Goal: Ability to disclose and discuss suicidal ideas will improve  Description: Ability to disclose and discuss suicidal ideas will improve  11/12/2020 1353 by Bret Phillips RN  Outcome: Completed  11/12/2020 0818 by Bret Phillips RN  Outcome: Ongoing  11/12/2020 0239 by Ruth Lizama RN  Outcome: Ongoing  Goal: Able to verbalize support systems  Description: Able to verbalize support systems  11/12/2020 1353 by Bret Phillips RN  Outcome: Completed  11/12/2020 0818 by Bret Phillips RN  Outcome: Ongoing  11/12/2020 0239 by Ruth Lizama RN  Outcome: Ongoing  Goal: Absence of self-harm  Description: Absence of self-harm  11/12/2020 1353 by Bret Phillips RN  Outcome: Completed  11/12/2020 0818 by Bret Phillips RN  Outcome: Ongoing  11/12/2020 0239 by Ruth Lizama RN  Outcome: Met This Shift  Goal: Patient specific goal  Description: Patient specific goal  11/12/2020 1353 by Bret Phillips RN  Outcome: Completed  11/12/2020 0818 by Bret Phillips RN  Outcome: Ongoing  11/12/2020 0239 by Ruth Lizama RN  Outcome: Ongoing  Goal: Participates in care planning  Description: Participates in care planning  11/12/2020 1353 by Stacy Mcfarland RN  Outcome: Completed  11/12/2020 0818 by Stacy Mcfarland RN  Outcome: Ongoing  11/12/2020 0239 by Reji Hoyt RN  Outcome: Ongoing

## 2023-02-06 PROBLEM — D64.9 ANEMIA: Status: ACTIVE | Noted: 2023-02-06

## 2023-02-06 PROBLEM — E55.9 VITAMIN D DEFICIENCY: Status: ACTIVE | Noted: 2023-02-06

## 2023-02-06 PROBLEM — F43.10 PTSD (POST-TRAUMATIC STRESS DISORDER): Status: ACTIVE | Noted: 2023-02-06

## 2023-02-06 PROBLEM — F32.A DEPRESSION: Status: ACTIVE | Noted: 2023-02-06

## 2023-02-06 PROBLEM — R10.2 PELVIC PAIN IN FEMALE: Status: ACTIVE | Noted: 2023-02-06

## 2023-02-06 PROBLEM — J45.20 MILD INTERMITTENT ASTHMA (HHS-HCC): Status: ACTIVE | Noted: 2023-02-06

## 2023-02-06 PROBLEM — F20.9 SCHIZOPHRENIA (MULTI): Status: ACTIVE | Noted: 2023-02-06

## 2023-02-06 PROBLEM — F41.9 ANXIETY DISORDER: Status: ACTIVE | Noted: 2023-02-06

## 2023-02-06 PROBLEM — N93.8 DUB (DYSFUNCTIONAL UTERINE BLEEDING): Status: ACTIVE | Noted: 2023-02-06

## 2023-02-06 PROBLEM — T78.40XA ALLERGY: Status: ACTIVE | Noted: 2023-02-06

## 2023-02-06 PROBLEM — F31.5 BIPOLAR AFFECTIVE DISORDER, DEPRESSED, SEVERE, WITH PSYCHOTIC BEHAVIOR (MULTI): Status: ACTIVE | Noted: 2023-02-06

## 2023-02-06 PROBLEM — B36.9 OTOMYCOSIS: Status: ACTIVE | Noted: 2023-02-06

## 2023-02-06 PROBLEM — G44.89 OTHER HEADACHE SYNDROME: Status: ACTIVE | Noted: 2023-02-06

## 2023-02-06 PROBLEM — H66.90 OTITIS MEDIA: Status: ACTIVE | Noted: 2023-02-06

## 2023-02-06 PROBLEM — E28.2 PCOS (POLYCYSTIC OVARIAN SYNDROME): Status: ACTIVE | Noted: 2023-02-06

## 2023-02-06 PROBLEM — M54.50 CHRONIC LOW BACK PAIN: Status: ACTIVE | Noted: 2023-02-06

## 2023-02-06 PROBLEM — R41.89 BRAIN FOG: Status: ACTIVE | Noted: 2023-02-06

## 2023-02-06 PROBLEM — N89.8 VAGINAL IRRITATION: Status: ACTIVE | Noted: 2023-02-06

## 2023-02-06 PROBLEM — R53.83 FATIGUE: Status: ACTIVE | Noted: 2023-02-06

## 2023-02-06 PROBLEM — O99.820 GROUP B STREPTOCOCCUS CARRIER, ANTEPARTUM (HHS-HCC): Status: ACTIVE | Noted: 2023-02-06

## 2023-02-06 PROBLEM — B00.1 RECURRENT COLD SORES: Status: ACTIVE | Noted: 2023-02-06

## 2023-02-06 PROBLEM — I10 HIGH BLOOD PRESSURE: Status: ACTIVE | Noted: 2023-02-06

## 2023-02-06 PROBLEM — O10.419: Status: ACTIVE | Noted: 2023-02-06

## 2023-02-06 PROBLEM — R51.9 HEADACHE: Status: ACTIVE | Noted: 2023-02-06

## 2023-02-06 PROBLEM — G89.29 CHRONIC LOW BACK PAIN: Status: ACTIVE | Noted: 2023-02-06

## 2023-02-06 PROBLEM — N94.9 GENITAL LESION, FEMALE: Status: ACTIVE | Noted: 2023-02-06

## 2023-02-06 PROBLEM — R12 HEARTBURN: Status: ACTIVE | Noted: 2023-02-06

## 2023-02-06 PROBLEM — J20.9 ACUTE BRONCHITIS: Status: ACTIVE | Noted: 2023-02-06

## 2023-02-06 PROBLEM — R10.84 DIFFUSE ABDOMINAL PAIN: Status: ACTIVE | Noted: 2023-02-06

## 2023-02-06 PROBLEM — H62.40 OTOMYCOSIS: Status: ACTIVE | Noted: 2023-02-06

## 2023-02-06 RX ORDER — LURASIDONE HYDROCHLORIDE 20 MG/1
TABLET, FILM COATED ORAL
COMMUNITY
Start: 2022-10-13

## 2023-02-06 RX ORDER — GABAPENTIN 300 MG/1
1 CAPSULE ORAL 3 TIMES DAILY
COMMUNITY
Start: 2022-10-13 | End: 2023-03-20 | Stop reason: WASHOUT

## 2023-02-06 RX ORDER — NORELGESTROMIN AND ETHINYL ESTRADIOL 150; 35 UG/D; UG/D
PATCH TRANSDERMAL
COMMUNITY
Start: 2022-10-13 | End: 2023-07-13

## 2023-02-07 RX ORDER — ALBUTEROL SULFATE 90 UG/1
2 AEROSOL, METERED RESPIRATORY (INHALATION) EVERY 4 HOURS PRN
COMMUNITY

## 2023-02-07 RX ORDER — ERGOCALCIFEROL 1.25 MG/1
1.25 CAPSULE ORAL
COMMUNITY
End: 2023-03-20 | Stop reason: SDUPTHER

## 2023-02-07 RX ORDER — FLUTICASONE PROPIONATE 44 UG/1
2 AEROSOL, METERED RESPIRATORY (INHALATION)
COMMUNITY

## 2023-02-07 RX ORDER — VALACYCLOVIR HYDROCHLORIDE 500 MG/1
500 TABLET, FILM COATED ORAL DAILY
COMMUNITY
End: 2023-03-20 | Stop reason: SDUPTHER

## 2023-02-07 RX ORDER — TOPIRAMATE 50 MG/1
TABLET, FILM COATED ORAL
COMMUNITY
End: 2023-03-20 | Stop reason: WASHOUT

## 2023-02-07 RX ORDER — PRAZOSIN HYDROCHLORIDE 5 MG/1
CAPSULE ORAL
COMMUNITY

## 2023-03-20 ENCOUNTER — OFFICE VISIT (OUTPATIENT)
Dept: PRIMARY CARE | Facility: CLINIC | Age: 23
End: 2023-03-20
Payer: COMMERCIAL

## 2023-03-20 VITALS
WEIGHT: 216 LBS | TEMPERATURE: 96.8 F | HEIGHT: 62 IN | BODY MASS INDEX: 39.75 KG/M2 | OXYGEN SATURATION: 97 % | DIASTOLIC BLOOD PRESSURE: 72 MMHG | HEART RATE: 73 BPM | SYSTOLIC BLOOD PRESSURE: 114 MMHG

## 2023-03-20 DIAGNOSIS — Z86.69 HISTORY OF PAPILLEDEMA: ICD-10-CM

## 2023-03-20 DIAGNOSIS — B00.1 RECURRENT COLD SORES: ICD-10-CM

## 2023-03-20 DIAGNOSIS — R30.0 DYSURIA: ICD-10-CM

## 2023-03-20 DIAGNOSIS — H57.10 ACUTE EYE PAIN: ICD-10-CM

## 2023-03-20 DIAGNOSIS — H57.12 RETRO-ORBITAL PAIN OF LEFT EYE: Primary | ICD-10-CM

## 2023-03-20 DIAGNOSIS — Z86.19 HISTORY OF CHLAMYDIA INFECTION: ICD-10-CM

## 2023-03-20 DIAGNOSIS — E55.9 VITAMIN D DEFICIENCY: ICD-10-CM

## 2023-03-20 PROCEDURE — 1036F TOBACCO NON-USER: CPT | Performed by: STUDENT IN AN ORGANIZED HEALTH CARE EDUCATION/TRAINING PROGRAM

## 2023-03-20 PROCEDURE — 87205 SMEAR GRAM STAIN: CPT

## 2023-03-20 PROCEDURE — 99214 OFFICE O/P EST MOD 30 MIN: CPT | Performed by: STUDENT IN AN ORGANIZED HEALTH CARE EDUCATION/TRAINING PROGRAM

## 2023-03-20 PROCEDURE — 3078F DIAST BP <80 MM HG: CPT | Performed by: STUDENT IN AN ORGANIZED HEALTH CARE EDUCATION/TRAINING PROGRAM

## 2023-03-20 PROCEDURE — 3074F SYST BP LT 130 MM HG: CPT | Performed by: STUDENT IN AN ORGANIZED HEALTH CARE EDUCATION/TRAINING PROGRAM

## 2023-03-20 RX ORDER — GABAPENTIN 400 MG/1
CAPSULE ORAL
COMMUNITY
Start: 2023-03-03

## 2023-03-20 RX ORDER — ACETAMINOPHEN 325 MG/1
TABLET ORAL
COMMUNITY
Start: 2022-07-10

## 2023-03-20 RX ORDER — TOPIRAMATE 100 MG/1
TABLET, FILM COATED ORAL
COMMUNITY
Start: 2023-03-03

## 2023-03-20 RX ORDER — VALACYCLOVIR HYDROCHLORIDE 500 MG/1
500 TABLET, FILM COATED ORAL DAILY
Qty: 30 TABLET | Refills: 3 | Status: SHIPPED | OUTPATIENT
Start: 2023-03-20

## 2023-03-20 RX ORDER — ERGOCALCIFEROL 1.25 MG/1
50000 CAPSULE ORAL
Qty: 12 CAPSULE | Refills: 0 | Status: SHIPPED | OUTPATIENT
Start: 2023-03-20

## 2023-03-20 SDOH — ECONOMIC STABILITY: FOOD INSECURITY: WITHIN THE PAST 12 MONTHS, YOU WORRIED THAT YOUR FOOD WOULD RUN OUT BEFORE YOU GOT MONEY TO BUY MORE.: NEVER TRUE

## 2023-03-20 SDOH — ECONOMIC STABILITY: FOOD INSECURITY: WITHIN THE PAST 12 MONTHS, THE FOOD YOU BOUGHT JUST DIDN'T LAST AND YOU DIDN'T HAVE MONEY TO GET MORE.: NEVER TRUE

## 2023-03-20 ASSESSMENT — ENCOUNTER SYMPTOMS
DIZZINESS: 0
SHORTNESS OF BREATH: 0
FATIGUE: 0
CONSTIPATION: 0
WHEEZING: 0
MUSCULOSKELETAL NEGATIVE: 1
NAUSEA: 0
DEPRESSION: 0
PALPITATIONS: 0
OCCASIONAL FEELINGS OF UNSTEADINESS: 0
LOSS OF SENSATION IN FEET: 0
CONFUSION: 0
UNEXPECTED WEIGHT CHANGE: 0
FEVER: 0
DIARRHEA: 0
CHILLS: 0
VOMITING: 0
HEADACHES: 0
COLOR CHANGE: 0
ABDOMINAL PAIN: 0
COUGH: 0

## 2023-03-20 ASSESSMENT — PATIENT HEALTH QUESTIONNAIRE - PHQ9
1. LITTLE INTEREST OR PLEASURE IN DOING THINGS: NOT AT ALL
2. FEELING DOWN, DEPRESSED OR HOPELESS: NOT AT ALL
SUM OF ALL RESPONSES TO PHQ9 QUESTIONS 1 & 2: 0

## 2023-03-20 ASSESSMENT — LIFESTYLE VARIABLES: HOW MANY STANDARD DRINKS CONTAINING ALCOHOL DO YOU HAVE ON A TYPICAL DAY: PATIENT DOES NOT DRINK

## 2023-03-20 NOTE — PROGRESS NOTES
"Subjective   Patient ID: Annamaria Mcgrath is a 23 y.o. female who presents for Follow-up (2 mth) and Go over labs from January.    # Asthma   - she feels better; last wk was having SOB   - using albuterol now as needed; uses prior to work out   - uses flovent daily as rx'd.     # Cold sores  - no flare ups after being on daily suppressive meds   - was started on valacyclovir 500 mg     # Eye lesion # Eye pain  - has lesion on inside of lower eyelid x months   - c/o pain behind eyeball     Reports pelvic pain; feels urethral pain; pain on urination; denies hematuria; sex active w/ one male partner; remote h/o STDs but not recently.     Review of Systems   Constitutional:  Negative for chills, fatigue, fever and unexpected weight change.   HENT: Negative.     Respiratory:  Negative for cough, shortness of breath and wheezing.    Cardiovascular:  Negative for chest pain, palpitations and leg swelling.   Gastrointestinal:  Negative for abdominal pain, constipation, diarrhea, nausea and vomiting.   Musculoskeletal: Negative.    Skin:  Negative for color change and rash.   Neurological:  Negative for dizziness and headaches.   Psychiatric/Behavioral:  Negative for behavioral problems and confusion.        Objective   /72 (BP Location: Left arm, Patient Position: Sitting, BP Cuff Size: Large adult)   Pulse 73   Temp 36 °C (96.8 °F)   Ht 1.575 m (5' 2\")   Wt 98 kg (216 lb)   SpO2 97%   BMI 39.51 kg/m²     Physical Exam  Vitals and nursing note reviewed.   Constitutional:       Appearance: Normal appearance.   Eyes:      General: No scleral icterus.        Right eye: No discharge.         Left eye: No discharge.      Extraocular Movements: Extraocular movements intact.      Conjunctiva/sclera: Conjunctivae normal.      Pupils: Pupils are equal, round, and reactive to light.      Comments: L eye lower eyelid: inside of eyelid has small not tender lesion.    Cardiovascular:      Rate and Rhythm: Normal rate and regular " rhythm.      Pulses: Normal pulses.      Heart sounds: Normal heart sounds.   Pulmonary:      Effort: Pulmonary effort is normal. No respiratory distress.      Breath sounds: Normal breath sounds.   Abdominal:      General: Abdomen is flat. Bowel sounds are normal.      Palpations: Abdomen is soft.   Musculoskeletal:         General: Normal range of motion.   Neurological:      General: No focal deficit present.      Mental Status: She is alert and oriented to person, place, and time.   Psychiatric:         Mood and Affect: Mood normal.         Behavior: Behavior normal.       Assessment/Plan   She is here for FU visit. She is having retro orbital pain x 1 wk; no vision change; little concerning for papilledema d/t prev h/o papilledema req spinal tap. We will put urgent referral to see ophtho; rec to call for appt. Seek ER care if pain worsens.   Also she is having urinary issues; some dysuria; could UTI vs STI vs BV; will obtain UA and other STDs panel as ordered below. Others as listed below.   Problem List Items Addressed This Visit          Endocrine/Metabolic    Vitamin D deficiency    Relevant Medications    ergocalciferol (Vitamin D-2) 1.25 MG (95411 UT) capsule       Infectious/Inflammatory    Recurrent cold sores    Relevant Medications    valACYclovir (Valtrex) 500 mg tablet     Other Visit Diagnoses       Retro-orbital pain of left eye    -  Primary    Relevant Orders    Referral to Ophthalmology    Dysuria        Relevant Orders    Urinalysis with Reflex Microscopic    C. trachomatis / N. gonorrhoeae, DNA probe    Trichomonas vaginalis, Amplified    Gram Stain for Bacterial Vaginosis/Yeast    History of chlamydia infection        Relevant Orders    C. trachomatis / N. gonorrhoeae, DNA probe    Trichomonas vaginalis, Amplified    Gram Stain for Bacterial Vaginosis/Yeast    History of papilledema        Relevant Orders    Referral to Ophthalmology    Acute eye pain        Relevant Orders    Referral to  Ophthalmology          Tariq Parker MD   [facility]

## 2023-03-21 LAB
CLUE CELLS: NORMAL
NUGENT SCORE: 1
YEAST: NORMAL

## 2023-08-25 LAB — THYROTROPIN (MIU/L) IN SER/PLAS BY DETECTION LIMIT <= 0.05 MIU/L: 1.75 MIU/L (ref 0.44–3.98)

## 2023-08-29 LAB
CHLAMYDIA TRACH., AMPLIFIED: NEGATIVE
N. GONORRHEA, AMPLIFIED: NEGATIVE
TRICHOMONAS VAGINALIS: NEGATIVE

## 2024-06-24 ENCOUNTER — APPOINTMENT (OUTPATIENT)
Dept: PRIMARY CARE | Facility: CLINIC | Age: 24
End: 2024-06-24
Payer: MEDICAID

## 2024-06-24 VITALS
DIASTOLIC BLOOD PRESSURE: 70 MMHG | WEIGHT: 185 LBS | HEART RATE: 57 BPM | BODY MASS INDEX: 34.04 KG/M2 | SYSTOLIC BLOOD PRESSURE: 103 MMHG | TEMPERATURE: 97.8 F | HEIGHT: 62 IN | OXYGEN SATURATION: 98 % | RESPIRATION RATE: 16 BRPM

## 2024-06-24 DIAGNOSIS — G89.29 CHRONIC MIDLINE BACK PAIN, UNSPECIFIED BACK LOCATION: ICD-10-CM

## 2024-06-24 DIAGNOSIS — M26.621 ARTHRALGIA OF RIGHT TEMPOROMANDIBULAR JOINT: Primary | ICD-10-CM

## 2024-06-24 DIAGNOSIS — M54.9 CHRONIC MIDLINE BACK PAIN, UNSPECIFIED BACK LOCATION: ICD-10-CM

## 2024-06-24 PROCEDURE — 99213 OFFICE O/P EST LOW 20 MIN: CPT | Performed by: STUDENT IN AN ORGANIZED HEALTH CARE EDUCATION/TRAINING PROGRAM

## 2024-06-24 PROCEDURE — 3074F SYST BP LT 130 MM HG: CPT | Performed by: STUDENT IN AN ORGANIZED HEALTH CARE EDUCATION/TRAINING PROGRAM

## 2024-06-24 PROCEDURE — 3078F DIAST BP <80 MM HG: CPT | Performed by: STUDENT IN AN ORGANIZED HEALTH CARE EDUCATION/TRAINING PROGRAM

## 2024-06-24 RX ORDER — BUSPIRONE HYDROCHLORIDE 30 MG/1
15 TABLET ORAL 2 TIMES DAILY
COMMUNITY
Start: 2024-04-24

## 2024-06-24 RX ORDER — NAPROXEN 500 MG/1
500 TABLET ORAL 2 TIMES DAILY PRN
Qty: 20 TABLET | Refills: 0 | Status: SHIPPED | OUTPATIENT
Start: 2024-06-24

## 2024-06-24 RX ORDER — GABAPENTIN 300 MG/1
300 CAPSULE ORAL 2 TIMES DAILY
Qty: 60 CAPSULE | Refills: 0 | Status: SHIPPED | OUTPATIENT
Start: 2024-06-24

## 2024-06-24 SDOH — ECONOMIC STABILITY: FOOD INSECURITY: WITHIN THE PAST 12 MONTHS, YOU WORRIED THAT YOUR FOOD WOULD RUN OUT BEFORE YOU GOT MONEY TO BUY MORE.: NEVER TRUE

## 2024-06-24 SDOH — ECONOMIC STABILITY: FOOD INSECURITY: WITHIN THE PAST 12 MONTHS, THE FOOD YOU BOUGHT JUST DIDN'T LAST AND YOU DIDN'T HAVE MONEY TO GET MORE.: NEVER TRUE

## 2024-06-24 ASSESSMENT — LIFESTYLE VARIABLES
HOW OFTEN DO YOU HAVE A DRINK CONTAINING ALCOHOL: NEVER
HOW OFTEN DO YOU HAVE SIX OR MORE DRINKS ON ONE OCCASION: NEVER
AUDIT-C TOTAL SCORE: 0
SKIP TO QUESTIONS 9-10: 1
HOW MANY STANDARD DRINKS CONTAINING ALCOHOL DO YOU HAVE ON A TYPICAL DAY: PATIENT DOES NOT DRINK

## 2024-06-24 ASSESSMENT — PATIENT HEALTH QUESTIONNAIRE - PHQ9
10. IF YOU CHECKED OFF ANY PROBLEMS, HOW DIFFICULT HAVE THESE PROBLEMS MADE IT FOR YOU TO DO YOUR WORK, TAKE CARE OF THINGS AT HOME, OR GET ALONG WITH OTHER PEOPLE: SOMEWHAT DIFFICULT
1. LITTLE INTEREST OR PLEASURE IN DOING THINGS: SEVERAL DAYS
SUM OF ALL RESPONSES TO PHQ9 QUESTIONS 1 & 2: 2
2. FEELING DOWN, DEPRESSED OR HOPELESS: SEVERAL DAYS

## 2024-06-24 ASSESSMENT — PAIN SCALES - GENERAL: PAINLEVEL: 7

## 2024-06-24 NOTE — PROGRESS NOTES
"Subjective   Patient ID: Annamaria Mcgrath is a 24 y.o. female who presents for Sick Visit (Pt c/o jaw pain and ear pain.  Pt was prescribed prednisone but it made her face swell.  Pt is having an MRI tomorrow on her back).    HPI   She is here for sick visit. Reports having pain on R TMJ joint area x 1-2 mo; reports pain worse with opening mouth, as eating, talking; was seen at the  in [place] put on prednisone but stopped after 2 doses d/t SE as face swelling.   Reports she is taking IBU/tylenol w/o much help. She is not on any BC, LMP was on 06/10/24; last sex encounter a mo ago. Also following with ortho at [facility] for back issues; going for MRI upper back tmr.   Reports prev she was taking gabapentin for her back pain w/ some help, would like to go back on it if possible,   Verified from [facility].     Review of Systems   Constitutional:  Negative for chills, fatigue, fever and unexpected weight change.   HENT: Negative.     Respiratory:  Negative for cough, shortness of breath and wheezing.    Cardiovascular:  Negative for chest pain, palpitations and leg swelling.   Gastrointestinal:  Negative for abdominal pain, constipation, diarrhea, nausea and vomiting.   Musculoskeletal:  Positive for back pain.   Skin:  Negative for color change and rash.   Neurological:  Negative for dizziness and headaches.   Psychiatric/Behavioral:  Negative for behavioral problems and confusion.        Objective   /70 (BP Location: Left arm, Patient Position: Sitting, BP Cuff Size: Adult)   Pulse 57   Temp 36.6 °C (97.8 °F) (Temporal)   Resp 16   Ht 1.575 m (5' 2\")   Wt 83.9 kg (185 lb)   SpO2 98%   BMI 33.84 kg/m²     Physical Exam  Vitals and nursing note reviewed.   Constitutional:       Appearance: Normal appearance.   Cardiovascular:      Rate and Rhythm: Normal rate and regular rhythm.      Pulses: Normal pulses.      Heart sounds: Normal heart sounds.   Pulmonary:      Effort: Pulmonary effort is normal.      " Breath sounds: Normal breath sounds.   Abdominal:      General: Abdomen is flat. Bowel sounds are normal.      Palpations: Abdomen is soft.   Musculoskeletal:         General: Normal range of motion.      Comments: R jaw: mild-mod ttp at the TMJ site, worse with jaw opening & closing.   L jaw appears normal.    Neurological:      General: No focal deficit present.      Mental Status: She is alert.   Psychiatric:         Mood and Affect: Mood normal.         Behavior: Behavior normal.       Assessment/Plan   She is here for sick visit.  She likely has right TMJ pain syndrome, will do trial of naproxen 500 mg twice daily as below.  Of note: Patient did not tolerate prednisone as started by UC.  Recommend icing few times daily. Possible referral to OMFS at [facility].   Has acute on chronic lower back pain, continue follow-up with the Ortho at [facility] and get MRI as schd for tmr. We will restart on gabapentin 300 mg bid as below, may help with TMJ pain as well. Adv to discuss more with ortho team at next visit.   Problem List Items Addressed This Visit    None  Visit Diagnoses         Codes    Arthralgia of right temporomandibular joint    -  Primary M26.621    Relevant Medications    naproxen (Naprosyn) 500 mg tablet    Chronic midline back pain, unspecified back location     M54.9, G89.29    Relevant Medications    gabapentin (Neurontin) 300 mg capsule          Rtc 3 mo or sooner as needed    Tariq Parker MD    [facility]

## 2024-06-29 ASSESSMENT — ENCOUNTER SYMPTOMS
COUGH: 0
DIARRHEA: 0
NAUSEA: 0
VOMITING: 0
BACK PAIN: 1
CONFUSION: 0
HEADACHES: 0
PALPITATIONS: 0
UNEXPECTED WEIGHT CHANGE: 0
WHEEZING: 0
CHILLS: 0
ABDOMINAL PAIN: 0
FEVER: 0
COLOR CHANGE: 0
FATIGUE: 0
SHORTNESS OF BREATH: 0
CONSTIPATION: 0
DIZZINESS: 0

## 2024-08-29 ENCOUNTER — APPOINTMENT (OUTPATIENT)
Dept: PRIMARY CARE | Facility: CLINIC | Age: 24
End: 2024-08-29
Payer: MEDICAID

## 2024-08-29 VITALS
SYSTOLIC BLOOD PRESSURE: 115 MMHG | HEART RATE: 97 BPM | RESPIRATION RATE: 16 BRPM | DIASTOLIC BLOOD PRESSURE: 73 MMHG | WEIGHT: 183 LBS | BODY MASS INDEX: 33.68 KG/M2 | HEIGHT: 62 IN | OXYGEN SATURATION: 99 % | TEMPERATURE: 96.8 F

## 2024-08-29 DIAGNOSIS — M25.50 POLYARTHRALGIA: Primary | ICD-10-CM

## 2024-08-29 DIAGNOSIS — M79.18 MYALGIA, MULTIPLE SITES: ICD-10-CM

## 2024-08-29 DIAGNOSIS — J45.20 MILD INTERMITTENT ASTHMA, UNSPECIFIED WHETHER COMPLICATED (HHS-HCC): ICD-10-CM

## 2024-08-29 DIAGNOSIS — Z83.2 FAMILY HISTORY OF AUTOIMMUNE DISORDER: ICD-10-CM

## 2024-08-29 DIAGNOSIS — B00.1 RECURRENT COLD SORES: ICD-10-CM

## 2024-08-29 DIAGNOSIS — F20.9 SCHIZOPHRENIA, UNSPECIFIED TYPE (MULTI): ICD-10-CM

## 2024-08-29 DIAGNOSIS — G89.29 CHRONIC MIDLINE BACK PAIN, UNSPECIFIED BACK LOCATION: ICD-10-CM

## 2024-08-29 DIAGNOSIS — M54.9 CHRONIC MIDLINE BACK PAIN, UNSPECIFIED BACK LOCATION: ICD-10-CM

## 2024-08-29 DIAGNOSIS — M26.621 ARTHRALGIA OF RIGHT TEMPOROMANDIBULAR JOINT: ICD-10-CM

## 2024-08-29 PROCEDURE — 3074F SYST BP LT 130 MM HG: CPT | Performed by: STUDENT IN AN ORGANIZED HEALTH CARE EDUCATION/TRAINING PROGRAM

## 2024-08-29 PROCEDURE — 3008F BODY MASS INDEX DOCD: CPT | Performed by: STUDENT IN AN ORGANIZED HEALTH CARE EDUCATION/TRAINING PROGRAM

## 2024-08-29 PROCEDURE — 3078F DIAST BP <80 MM HG: CPT | Performed by: STUDENT IN AN ORGANIZED HEALTH CARE EDUCATION/TRAINING PROGRAM

## 2024-08-29 PROCEDURE — 99214 OFFICE O/P EST MOD 30 MIN: CPT | Performed by: STUDENT IN AN ORGANIZED HEALTH CARE EDUCATION/TRAINING PROGRAM

## 2024-08-29 PROCEDURE — 1036F TOBACCO NON-USER: CPT | Performed by: STUDENT IN AN ORGANIZED HEALTH CARE EDUCATION/TRAINING PROGRAM

## 2024-08-29 RX ORDER — GABAPENTIN 300 MG/1
300 CAPSULE ORAL 2 TIMES DAILY
Qty: 60 CAPSULE | Refills: 2 | Status: SHIPPED | OUTPATIENT
Start: 2024-08-29

## 2024-08-29 RX ORDER — VALACYCLOVIR HYDROCHLORIDE 500 MG/1
500 TABLET, FILM COATED ORAL DAILY
Qty: 30 TABLET | Refills: 1 | Status: SHIPPED | OUTPATIENT
Start: 2024-08-29

## 2024-08-29 RX ORDER — CLOMIPRAMINE HYDROCHLORIDE 50 MG/1
50 CAPSULE ORAL NIGHTLY
COMMUNITY
Start: 2024-06-27

## 2024-08-29 RX ORDER — NAPROXEN 500 MG/1
500 TABLET ORAL 2 TIMES DAILY PRN
Qty: 20 TABLET | Refills: 0 | Status: SHIPPED | OUTPATIENT
Start: 2024-08-29

## 2024-08-29 SDOH — ECONOMIC STABILITY: FOOD INSECURITY: WITHIN THE PAST 12 MONTHS, THE FOOD YOU BOUGHT JUST DIDN'T LAST AND YOU DIDN'T HAVE MONEY TO GET MORE.: NEVER TRUE

## 2024-08-29 SDOH — ECONOMIC STABILITY: FOOD INSECURITY: WITHIN THE PAST 12 MONTHS, YOU WORRIED THAT YOUR FOOD WOULD RUN OUT BEFORE YOU GOT MONEY TO BUY MORE.: NEVER TRUE

## 2024-08-29 ASSESSMENT — PATIENT HEALTH QUESTIONNAIRE - PHQ9
9. THOUGHTS THAT YOU WOULD BE BETTER OFF DEAD, OR OF HURTING YOURSELF: NOT AT ALL
SUM OF ALL RESPONSES TO PHQ9 QUESTIONS 1 & 2: 6
7. TROUBLE CONCENTRATING ON THINGS, SUCH AS READING THE NEWSPAPER OR WATCHING TELEVISION: NEARLY EVERY DAY
1. LITTLE INTEREST OR PLEASURE IN DOING THINGS: NEARLY EVERY DAY
3. TROUBLE FALLING OR STAYING ASLEEP: NEARLY EVERY DAY
SUM OF ALL RESPONSES TO PHQ QUESTIONS 1-9: 24
6. FEELING BAD ABOUT YOURSELF - OR THAT YOU ARE A FAILURE OR HAVE LET YOURSELF OR YOUR FAMILY DOWN: NEARLY EVERY DAY
2. FEELING DOWN, DEPRESSED OR HOPELESS: NEARLY EVERY DAY
8. MOVING OR SPEAKING SO SLOWLY THAT OTHER PEOPLE COULD HAVE NOTICED. OR THE OPPOSITE, BEING SO FIGETY OR RESTLESS THAT YOU HAVE BEEN MOVING AROUND A LOT MORE THAN USUAL: NEARLY EVERY DAY
5. POOR APPETITE OR OVEREATING: NEARLY EVERY DAY
4. FEELING TIRED OR HAVING LITTLE ENERGY: NEARLY EVERY DAY

## 2024-08-29 ASSESSMENT — ENCOUNTER SYMPTOMS
HEADACHES: 0
NAUSEA: 0
PALPITATIONS: 0
SHORTNESS OF BREATH: 0
BACK PAIN: 1
COUGH: 0
VOMITING: 0
ABDOMINAL PAIN: 0
COLOR CHANGE: 0
MYALGIAS: 1
CONSTIPATION: 0
CHILLS: 0
UNEXPECTED WEIGHT CHANGE: 0
DIARRHEA: 0
NECK PAIN: 1
FATIGUE: 0
ARTHRALGIAS: 1
DIZZINESS: 0
CONFUSION: 0
FEVER: 0
WHEEZING: 0

## 2024-08-29 ASSESSMENT — LIFESTYLE VARIABLES
SKIP TO QUESTIONS 9-10: 1
AUDIT-C TOTAL SCORE: 0
HOW MANY STANDARD DRINKS CONTAINING ALCOHOL DO YOU HAVE ON A TYPICAL DAY: PATIENT DOES NOT DRINK
HOW OFTEN DO YOU HAVE A DRINK CONTAINING ALCOHOL: NEVER
HOW OFTEN DO YOU HAVE SIX OR MORE DRINKS ON ONE OCCASION: NEVER

## 2024-08-29 ASSESSMENT — PAIN SCALES - GENERAL: PAINLEVEL: 7

## 2024-08-29 NOTE — PROGRESS NOTES
"Subjective   Patient ID: Annamaria Mcgrath is a 24 y.o. female who presents for Follow-up (3 month follow up. C/O pain throughout whole body.  Patient needs a new referral for jaw pain. ).    HPI   She is here for FU visit. Reports she is c/o pain throughout whole body as feet, body ache, arms/shoulders, back; constant pain and gets worse randomly x long time worse in the last 6 mo. Also reports ongoing R jaw pain, went to see ortho but was told to see diff doc for help. Reports her mother has some kind of autoimmune disease that she follows with rheumatology.     Review of Systems   Constitutional:  Negative for chills, fatigue, fever and unexpected weight change.   HENT: Negative.     Respiratory:  Negative for cough, shortness of breath and wheezing.    Cardiovascular:  Negative for chest pain, palpitations and leg swelling.   Gastrointestinal:  Negative for abdominal pain, constipation, diarrhea, nausea and vomiting.   Musculoskeletal:  Positive for arthralgias, back pain, myalgias and neck pain.   Skin:  Negative for color change and rash.   Neurological:  Negative for dizziness and headaches.   Psychiatric/Behavioral:  Negative for behavioral problems and confusion.        Objective   /73 (BP Location: Right arm, Patient Position: Sitting, BP Cuff Size: Adult)   Pulse 97   Temp 36 °C (96.8 °F) (Temporal)   Resp 16   Ht 1.575 m (5' 2\")   Wt 83 kg (183 lb)   SpO2 99%   BMI 33.47 kg/m²     Physical Exam  Vitals and nursing note reviewed.   Constitutional:       Appearance: Normal appearance.   Cardiovascular:      Rate and Rhythm: Normal rate and regular rhythm.      Pulses: Normal pulses.      Heart sounds: Normal heart sounds.   Pulmonary:      Effort: Pulmonary effort is normal.      Breath sounds: Normal breath sounds.   Abdominal:      General: Abdomen is flat. Bowel sounds are normal.      Palpations: Abdomen is soft.   Musculoskeletal:         General: No deformity or signs of injury. Normal range " of motion.      Right lower leg: No edema.      Left lower leg: No edema.      Comments: No change from before: R jaw: mild-mod ttp at the TMJ site, worse with jaw opening & closing.   L jaw appears normal.    Neurological:      General: No focal deficit present.      Mental Status: She is alert.      Gait: Gait normal.   Psychiatric:         Mood and Affect: Mood normal.         Behavior: Behavior normal.       Assessment/Plan   She is here for FU visit. Young dtr in the room. She is having generalized polyarthralgia and myalgia & also mother has some kind of autoimmune disease,  little c/f autoimmune illness vs other; will put referral to see rheum for further eval & mgmt.   Also has on going issues with TMJ pain syndrome, will referred to [facility] for further eval & mgmt. Other chronic problems are stable; continue all medications as usual. Rx refilled.     Problem List Items Addressed This Visit             ICD-10-CM    Schizophrenia (Multi) F20.9     Stable, cont following with psych.          Mild intermittent asthma ([facility]) J45.20     Controlled.          Recurrent cold sores B00.1    Relevant Medications    valACYclovir (Valtrex) 500 mg tablet     Other Visit Diagnoses         Codes    Polyarthralgia    -  Primary M25.50    Relevant Orders    Referral to Rheumatology    Chronic midline back pain, unspecified back location     M54.9, G89.29    Relevant Medications    gabapentin (Neurontin) 300 mg capsule    Arthralgia of right temporomandibular joint     M26.621    Relevant Medications    naproxen (Naprosyn) 500 mg tablet    Other Relevant Orders    Referral to Oral Maxillofacial Surgery    Myalgia, multiple sites     M79.18    Relevant Orders    Referral to Rheumatology    Family history of autoimmune disorder     Z83.2    Relevant Orders    Referral to Rheumatology          Rtc 4 mo for FU    Tariq Parker MD    [facility]

## 2024-10-09 ENCOUNTER — APPOINTMENT (OUTPATIENT)
Dept: PRIMARY CARE | Facility: CLINIC | Age: 24
End: 2024-10-09
Payer: MEDICAID

## 2024-10-10 ENCOUNTER — LAB (OUTPATIENT)
Dept: LAB | Facility: LAB | Age: 24
End: 2024-10-10
Payer: MEDICAID

## 2024-10-10 DIAGNOSIS — M25.561 PAIN IN RIGHT KNEE: Primary | ICD-10-CM

## 2024-10-10 LAB
BASOPHILS # BLD AUTO: 0.03 X10*3/UL (ref 0–0.1)
BASOPHILS NFR BLD AUTO: 0.4 %
CRP SERPL-MCNC: 2.64 MG/DL
EOSINOPHIL # BLD AUTO: 0.17 X10*3/UL (ref 0–0.7)
EOSINOPHIL NFR BLD AUTO: 2.5 %
ERYTHROCYTE [DISTWIDTH] IN BLOOD BY AUTOMATED COUNT: 12.7 % (ref 11.5–14.5)
ERYTHROCYTE [SEDIMENTATION RATE] IN BLOOD BY WESTERGREN METHOD: 36 MM/H (ref 0–20)
HCT VFR BLD AUTO: 39.6 % (ref 36–46)
HGB BLD-MCNC: 12.8 G/DL (ref 12–16)
IMM GRANULOCYTES # BLD AUTO: 0.02 X10*3/UL (ref 0–0.7)
IMM GRANULOCYTES NFR BLD AUTO: 0.3 % (ref 0–0.9)
LYMPHOCYTES # BLD AUTO: 1.46 X10*3/UL (ref 1.2–4.8)
LYMPHOCYTES NFR BLD AUTO: 21.2 %
MCH RBC QN AUTO: 27.5 PG (ref 26–34)
MCHC RBC AUTO-ENTMCNC: 32.3 G/DL (ref 32–36)
MCV RBC AUTO: 85 FL (ref 80–100)
MONOCYTES # BLD AUTO: 0.65 X10*3/UL (ref 0.1–1)
MONOCYTES NFR BLD AUTO: 9.4 %
NEUTROPHILS # BLD AUTO: 4.57 X10*3/UL (ref 1.2–7.7)
NEUTROPHILS NFR BLD AUTO: 66.2 %
NRBC BLD-RTO: 0 /100 WBCS (ref 0–0)
PLATELET # BLD AUTO: 276 X10*3/UL (ref 150–450)
RBC # BLD AUTO: 4.66 X10*6/UL (ref 4–5.2)
URATE SERPL-MCNC: 4.8 MG/DL (ref 2.3–6.7)
WBC # BLD AUTO: 6.9 X10*3/UL (ref 4.4–11.3)

## 2024-10-10 PROCEDURE — 86140 C-REACTIVE PROTEIN: CPT

## 2024-10-10 PROCEDURE — 86431 RHEUMATOID FACTOR QUANT: CPT

## 2024-10-10 PROCEDURE — 86618 LYME DISEASE ANTIBODY: CPT

## 2024-10-10 PROCEDURE — 36415 COLL VENOUS BLD VENIPUNCTURE: CPT

## 2024-10-10 PROCEDURE — 86038 ANTINUCLEAR ANTIBODIES: CPT

## 2024-10-10 PROCEDURE — 86235 NUCLEAR ANTIGEN ANTIBODY: CPT

## 2024-10-10 PROCEDURE — 85652 RBC SED RATE AUTOMATED: CPT

## 2024-10-10 PROCEDURE — 85025 COMPLETE CBC W/AUTO DIFF WBC: CPT

## 2024-10-10 PROCEDURE — 84550 ASSAY OF BLOOD/URIC ACID: CPT

## 2024-10-10 PROCEDURE — 86225 DNA ANTIBODY NATIVE: CPT

## 2024-10-11 LAB
ANA PATTERN: ABNORMAL
ANA SER QL HEP2 SUBST: POSITIVE
ANA TITR SER IF: ABNORMAL {TITER}
CENTROMERE B AB SER-ACNC: <0.2 AI
CHROMATIN AB SERPL-ACNC: 0.5 AI
DSDNA AB SER-ACNC: 2 IU/ML
ENA JO1 AB SER QL IA: <0.2 AI
ENA RNP AB SER IA-ACNC: <0.2 AI
ENA SCL70 AB SER QL IA: <0.2 AI
ENA SM AB SER IA-ACNC: <0.2 AI
ENA SM+RNP AB SER QL IA: <0.2 AI
ENA SS-A AB SER IA-ACNC: <0.2 AI
ENA SS-B AB SER IA-ACNC: <0.2 AI
RHEUMATOID FACT SER NEPH-ACNC: <10 IU/ML (ref 0–15)
RIBOSOMAL P AB SER-ACNC: <0.2 AI

## 2024-10-13 LAB — B BURGDOR.VLSE1+PEPC10 AB SER IA-ACNC: 0.86 IV

## 2024-10-31 ENCOUNTER — OFFICE VISIT (OUTPATIENT)
Dept: PRIMARY CARE | Facility: CLINIC | Age: 24
End: 2024-10-31
Payer: MEDICAID

## 2024-10-31 VITALS
OXYGEN SATURATION: 96 % | WEIGHT: 183 LBS | SYSTOLIC BLOOD PRESSURE: 102 MMHG | BODY MASS INDEX: 33.47 KG/M2 | TEMPERATURE: 97.1 F | HEART RATE: 82 BPM | DIASTOLIC BLOOD PRESSURE: 68 MMHG | RESPIRATION RATE: 19 BRPM

## 2024-10-31 DIAGNOSIS — M25.561 ACUTE PAIN OF RIGHT KNEE: Primary | ICD-10-CM

## 2024-10-31 PROCEDURE — 3074F SYST BP LT 130 MM HG: CPT

## 2024-10-31 PROCEDURE — 99213 OFFICE O/P EST LOW 20 MIN: CPT

## 2024-10-31 PROCEDURE — 3078F DIAST BP <80 MM HG: CPT

## 2024-10-31 PROCEDURE — 1036F TOBACCO NON-USER: CPT

## 2024-10-31 RX ORDER — MELOXICAM 15 MG/1
TABLET ORAL
COMMUNITY
Start: 2024-10-29

## 2024-10-31 SDOH — ECONOMIC STABILITY: FOOD INSECURITY: WITHIN THE PAST 12 MONTHS, THE FOOD YOU BOUGHT JUST DIDN'T LAST AND YOU DIDN'T HAVE MONEY TO GET MORE.: OFTEN TRUE

## 2024-10-31 SDOH — ECONOMIC STABILITY: FOOD INSECURITY: WITHIN THE PAST 12 MONTHS, YOU WORRIED THAT YOUR FOOD WOULD RUN OUT BEFORE YOU GOT MONEY TO BUY MORE.: OFTEN TRUE

## 2024-10-31 ASSESSMENT — PATIENT HEALTH QUESTIONNAIRE - PHQ9
10. IF YOU CHECKED OFF ANY PROBLEMS, HOW DIFFICULT HAVE THESE PROBLEMS MADE IT FOR YOU TO DO YOUR WORK, TAKE CARE OF THINGS AT HOME, OR GET ALONG WITH OTHER PEOPLE: SOMEWHAT DIFFICULT
4. FEELING TIRED OR HAVING LITTLE ENERGY: NEARLY EVERY DAY
9. THOUGHTS THAT YOU WOULD BE BETTER OFF DEAD, OR OF HURTING YOURSELF: NOT AT ALL
7. TROUBLE CONCENTRATING ON THINGS, SUCH AS READING THE NEWSPAPER OR WATCHING TELEVISION: NEARLY EVERY DAY
2. FEELING DOWN, DEPRESSED OR HOPELESS: NEARLY EVERY DAY
SUM OF ALL RESPONSES TO PHQ9 QUESTIONS 1 & 2: 6
5. POOR APPETITE OR OVEREATING: NEARLY EVERY DAY
3. TROUBLE FALLING OR STAYING ASLEEP: NEARLY EVERY DAY
1. LITTLE INTEREST OR PLEASURE IN DOING THINGS: NEARLY EVERY DAY
6. FEELING BAD ABOUT YOURSELF - OR THAT YOU ARE A FAILURE OR HAVE LET YOURSELF OR YOUR FAMILY DOWN: NEARLY EVERY DAY
8. MOVING OR SPEAKING SO SLOWLY THAT OTHER PEOPLE COULD HAVE NOTICED. OR THE OPPOSITE, BEING SO FIGETY OR RESTLESS THAT YOU HAVE BEEN MOVING AROUND A LOT MORE THAN USUAL: NOT AT ALL
SUM OF ALL RESPONSES TO PHQ QUESTIONS 1-9: 21

## 2024-10-31 ASSESSMENT — LIFESTYLE VARIABLES
HOW MANY STANDARD DRINKS CONTAINING ALCOHOL DO YOU HAVE ON A TYPICAL DAY: PATIENT DOES NOT DRINK
SKIP TO QUESTIONS 9-10: 1
HOW OFTEN DO YOU HAVE SIX OR MORE DRINKS ON ONE OCCASION: NEVER
AUDIT-C TOTAL SCORE: 0
HOW OFTEN DO YOU HAVE A DRINK CONTAINING ALCOHOL: NEVER

## 2024-10-31 ASSESSMENT — ENCOUNTER SYMPTOMS
EYES NEGATIVE: 1
DEPRESSION: 0
HEMATOLOGIC/LYMPHATIC NEGATIVE: 1
CONSTITUTIONAL NEGATIVE: 1
ENDOCRINE NEGATIVE: 1
PSYCHIATRIC NEGATIVE: 1
RESPIRATORY NEGATIVE: 1
CARDIOVASCULAR NEGATIVE: 1
NEUROLOGICAL NEGATIVE: 1
GASTROINTESTINAL NEGATIVE: 1
LOSS OF SENSATION IN FEET: 0
OCCASIONAL FEELINGS OF UNSTEADINESS: 0

## 2024-10-31 ASSESSMENT — PAIN SCALES - GENERAL: PAINLEVEL_OUTOF10: 9

## 2024-11-04 ENCOUNTER — HOSPITAL ENCOUNTER (OUTPATIENT)
Dept: RADIOLOGY | Facility: CLINIC | Age: 24
Discharge: HOME | End: 2024-11-04
Payer: MEDICAID

## 2024-11-04 ENCOUNTER — APPOINTMENT (OUTPATIENT)
Dept: ORTHOPEDIC SURGERY | Age: 24
End: 2024-11-04
Payer: MEDICAID

## 2024-11-04 VITALS — HEIGHT: 61 IN | BODY MASS INDEX: 34.55 KG/M2 | WEIGHT: 183 LBS

## 2024-11-04 DIAGNOSIS — M25.561 RIGHT KNEE PAIN, UNSPECIFIED CHRONICITY: ICD-10-CM

## 2024-11-04 DIAGNOSIS — M25.461 EFFUSION OF RIGHT KNEE: ICD-10-CM

## 2024-11-04 DIAGNOSIS — M25.461 EFFUSION OF RIGHT KNEE: Primary | ICD-10-CM

## 2024-11-04 DIAGNOSIS — M25.562 LEFT KNEE PAIN, UNSPECIFIED CHRONICITY: ICD-10-CM

## 2024-11-04 DIAGNOSIS — M22.42 CHONDROMALACIA OF BOTH PATELLAE: ICD-10-CM

## 2024-11-04 DIAGNOSIS — M22.41 CHONDROMALACIA OF BOTH PATELLAE: ICD-10-CM

## 2024-11-04 PROCEDURE — 1036F TOBACCO NON-USER: CPT | Performed by: EMERGENCY MEDICINE

## 2024-11-04 PROCEDURE — 73721 MRI JNT OF LWR EXTRE W/O DYE: CPT | Mod: RIGHT SIDE | Performed by: RADIOLOGY

## 2024-11-04 PROCEDURE — 3008F BODY MASS INDEX DOCD: CPT | Performed by: EMERGENCY MEDICINE

## 2024-11-04 PROCEDURE — 73721 MRI JNT OF LWR EXTRE W/O DYE: CPT | Mod: RT

## 2024-11-04 PROCEDURE — 99204 OFFICE O/P NEW MOD 45 MIN: CPT | Performed by: EMERGENCY MEDICINE

## 2024-11-04 NOTE — PROGRESS NOTES
Patient is here for bilateral knee pain, but has most her pain in the LEFT knee. Patient states that she fell about a month ago and that is when the pain started.     Subjective    Patient ID: Annamaria Mcgrath is a 24 y.o. female.    Chief Complaint: Pain of the Left Knee and Pain of the Right Knee     Last Surgery: No surgery found  Last Surgery Date: No surgery found    David 4-year-old female coming in with some acute bilateral knee pain that is worse on the right than the left.  The patient states that she fell down about 3 steps a month ago and landed on both of her knees.  Since that time she has had pretty severe pain that has limited her ability to walk.  She also reports that she is having some mechanical symptoms with grinding in both knees throughout range of motion and bending.  She has noticed that her right knee feels swollen.  Her left knee symptoms have been getting worse with activity and with time but she states that overall her right knee is more severe than the left.  She was seen by an outside facility/provider and was told that this could be rheumatologic.  She was started on meloxicam and referred to rheumatology.  She denies any similar prior injuries or episodes and has never had surgery on her knees.        Objective   Right Knee Exam     Muscle Strength   The patient has normal right knee strength.    Tenderness   The patient is experiencing tenderness in the medial joint line and patella.    Range of Motion   Extension:  abnormal   Flexion:  abnormal     Tests   Yolande:  Medial - positive Lateral - negative  Varus: negative Valgus: negative  Lachman:  Anterior - negative      Drawer:  Anterior - negative    Posterior - negative  Patellar apprehension: negative    Other   Erythema: absent  Sensation: normal  Pulse: present  Swelling: moderate  Effusion: effusion present    Comments:  Bilateral patellar crepitus throughout range of motion testing.  Positive patellar grind on both sides.   Positive Yolande's with an effusion on the right knee.      Left Knee Exam     Muscle Strength   The patient has normal left knee strength.    Tenderness   The patient is experiencing tenderness in the patella and medial joint line.    Range of Motion   Extension:  normal   Flexion:  abnormal     Tests   Yolande:  Medial - negative Lateral - negative  Varus: negative Valgus: negative  Lachman:  Anterior - negative      Drawer:  Anterior - negative     Posterior - negative  Patellar apprehension: negative    Other   Erythema: absent  Sensation: normal  Pulse: present  Swelling: none  Effusion: no effusion present            Image Results:  X-rays of both knees were reviewed and interpreted by me on 11/4/2024 and did not reveal any evidence of acute injuries or fractures but she did have some mild narrowing of the patellofemoral joint space with lateralization of the patella in both knees.    Assessment/Plan   Encounter Diagnoses:  Effusion of right knee    Right knee pain, unspecified chronicity    Left knee pain, unspecified chronicity    Chondromalacia of both patellae    Orders Placed This Encounter    XR knee right 4+ views    XR knee left 1-2 views    MR knee right wo IV contrast    Referral to Physical Therapy     No follow-ups on file.    We had a long discussion about her treatment options and ultimately decided for her to continue taking the meloxicam.  We are also going to pursue a stat MRI of the right knee to look for potential surgical pathology such as a meniscal tear given her symptoms, history, exam findings with an effusion and positive Yolande test.  I do think that she also has a component of bilateral chondromalacia patella.  She was therefore given a prescription for physical therapy and I will likely follow-up with her after the MRI.  If her MRI does not reveal any surgical pathology we could potentially also consider patellar stabilization braces and injections moving forward.    ** Please  excuse any errors in grammar or translation related to this dictation. Voice recognition software was utilized to prepare this document. **       Melvin Palma MD  [facility]

## 2024-11-11 ENCOUNTER — APPOINTMENT (OUTPATIENT)
Dept: ORTHOPEDIC SURGERY | Age: 24
End: 2024-11-11
Payer: MEDICAID

## 2024-11-11 ENCOUNTER — HOSPITAL ENCOUNTER (OUTPATIENT)
Dept: RADIOLOGY | Facility: EXTERNAL LOCATION | Age: 24
Discharge: HOME | End: 2024-11-11

## 2024-11-11 VITALS — BODY MASS INDEX: 34.55 KG/M2 | WEIGHT: 183 LBS | HEIGHT: 61 IN

## 2024-11-11 DIAGNOSIS — M25.461 EFFUSION OF RIGHT KNEE: Primary | ICD-10-CM

## 2024-11-11 DIAGNOSIS — M22.42 CHONDROMALACIA OF BOTH PATELLAE: ICD-10-CM

## 2024-11-11 DIAGNOSIS — M22.41 CHONDROMALACIA OF BOTH PATELLAE: ICD-10-CM

## 2024-11-11 LAB
BASOPHILS NFR FLD MANUAL: 0 %
BLASTS NFR FLD MANUAL: 0 %
CLARITY FLD: ABNORMAL
COLOR FLD: YELLOW
EOSINOPHIL NFR FLD MANUAL: 0 %
IMMATURE GRANULOCYTES IN FLUID: 0 %
LYMPHOCYTES NFR FLD MANUAL: 21 %
MONOS+MACROS NFR FLD MANUAL: 6 %
NEUTROPHILS NFR FLD MANUAL: 73 %
OTHER CELLS NFR FLD MANUAL: 0 %
PLASMA CELLS NFR FLD MANUAL: 0 %
RBC # FLD AUTO: ABNORMAL /UL
TOTAL CELLS COUNTED FLD: 100
WBC # FLD AUTO: ABNORMAL /UL

## 2024-11-11 PROCEDURE — 89060 EXAM SYNOVIAL FLUID CRYSTALS: CPT

## 2024-11-11 PROCEDURE — 87075 CULTR BACTERIA EXCEPT BLOOD: CPT

## 2024-11-11 PROCEDURE — 87070 CULTURE OTHR SPECIMN AEROBIC: CPT

## 2024-11-11 PROCEDURE — 89051 BODY FLUID CELL COUNT: CPT

## 2024-11-11 PROCEDURE — 1036F TOBACCO NON-USER: CPT | Performed by: EMERGENCY MEDICINE

## 2024-11-11 PROCEDURE — 3008F BODY MASS INDEX DOCD: CPT | Performed by: EMERGENCY MEDICINE

## 2024-11-11 PROCEDURE — 99214 OFFICE O/P EST MOD 30 MIN: CPT | Performed by: EMERGENCY MEDICINE

## 2024-11-11 PROCEDURE — 87205 SMEAR GRAM STAIN: CPT

## 2024-11-11 PROCEDURE — 20611 DRAIN/INJ JOINT/BURSA W/US: CPT | Performed by: EMERGENCY MEDICINE

## 2024-11-11 RX ORDER — LIDOCAINE HYDROCHLORIDE 10 MG/ML
2 INJECTION, SOLUTION INFILTRATION; PERINEURAL
Status: COMPLETED | OUTPATIENT
Start: 2024-11-11 | End: 2024-11-11

## 2024-11-11 NOTE — PROGRESS NOTES
Patient is here for bilateral knee pain, but has most her pain in the LEFT knee. Patient states that she fell about a month ago and that is when the pain started.     Subjective    Patient ID: Annamaria Mcgrath is a 24 y.o. female.    Chief Complaint: Pain of the Right Knee (Follow upafter MRI done 11/4/2024)     Last Surgery: No surgery found  Last Surgery Date: No surgery found    Annamaria is a 24-year-old female coming in with some acute bilateral knee pain that is worse on the right than the left.  The patient states that she fell down about 3 steps a month ago and landed on both of her knees.  Since that time she has had pretty severe pain that has limited her ability to walk.  She also reports that she is having some mechanical symptoms with grinding in both knees throughout range of motion and bending.  She has noticed that her right knee feels swollen.  Her left knee symptoms have been getting worse with activity and with time but she states that overall her right knee is more severe than the left.  She was seen by an outside facility/provider and was told that this could be rheumatologic.  She was started on meloxicam and referred to rheumatology.  She denies any similar prior injuries or episodes and has never had surgery on her knees. We decided for her to continue taking the meloxicam.  We are also going to pursue a stat MRI of the right knee to look for potential surgical pathology such as a meniscal tear given her symptoms, history, exam findings with an effusion and positive Yolande test.  I do think that she also has a component of bilateral chondromalacia patella.  She was therefore given a prescription for physical therapy and I will likely follow-up with her after the MRI.  If her MRI does not reveal any surgical pathology we could potentially also consider patellar stabilization braces and injections moving forward.    Update on 11/11/2024. Annamaria is coming back in for a follow up visit regarding her  bilateral knee pain that is worse on the right than the left.  She is also here today to follow-up after getting her right knee MRI. She is here today with her mother who is help provide additional history.  The patient states that her symptoms have been getting worse and that she is still having knee pain in both knees with limited range of motion.  I have        Objective   Right Knee Exam     Muscle Strength   The patient has normal right knee strength.    Tenderness   The patient is experiencing tenderness in the medial joint line and patella.    Range of Motion   Extension:  abnormal   Flexion:  abnormal     Tests   Yolande:  Medial - positive Lateral - negative  Varus: negative Valgus: negative  Lachman:  Anterior - negative      Drawer:  Anterior - negative    Posterior - negative  Patellar apprehension: negative    Other   Erythema: absent  Sensation: normal  Pulse: present  Swelling: moderate  Effusion: effusion present    Comments:  Bilateral patellar crepitus throughout range of motion testing.  Positive patellar grind on both sides.  Positive Yolande's with an effusion on the right knee.      Left Knee Exam     Muscle Strength   The patient has normal left knee strength.    Tenderness   The patient is experiencing tenderness in the patella and medial joint line.    Range of Motion   Extension:  normal   Flexion:  abnormal     Tests   Yolande:  Medial - negative Lateral - negative  Varus: negative Valgus: negative  Lachman:  Anterior - negative      Drawer:  Anterior - negative     Posterior - negative  Patellar apprehension: negative    Other   Erythema: absent  Sensation: normal  Pulse: present  Swelling: none  Effusion: effusion present    Comments:  Trace effusion            Image Results:  MRI of the right knee was reviewed and interpreted by me on 11/11/2024.  Knee effusion.  No surgical pathology noted.  Overall I agree with the radiologist's findings and interpretations.    Patient ID: Annamaria CHAVARRIA  Alcides is a 24 y.o. female.    L Inj/Asp: R knee on 11/11/2024 3:11 PM  Indications: pain  Details: 18 G needle, ultrasound-guided superolateral approach  Medications: 2 mL lidocaine 10 mg/mL (1 %)  Aspirate: 4 mL yellow and cloudy; sent for lab analysis  Outcome: tolerated well, no immediate complications  Procedure, treatment alternatives, risks and benefits explained, specific risks discussed. Consent was given by the patient. Immediately prior to procedure a time out was called to verify the correct patient, procedure, equipment, support staff and site/side marked as required. Patient was prepped and draped in the usual sterile fashion.           Assessment/Plan   Encounter Diagnoses:  Effusion of right knee    Chondromalacia of both patellae    Orders Placed This Encounter    L Inj/Asp: R knee    Sterile Fluid Culture/Smear    Point of Care Ultrasound    Crystal Identification and Pathologist Review Synovial Fluid    Body Fluid Cell Count With Differential    Crystal Identification, Synovial Fluid    Body Fluid Cell Count    Body Fluid Differential     No follow-ups on file.    We had a long discussion about her treatment options and ultimately decided to perform an aspiration of the right knee with ultrasound guidance.  The patient tolerated the procedure well without any complications and activity modifications were reviewed.  We are going to send the aspirated fluid to the lab to screen for crystals or infection.  My suspicion is that this is instead an inflammatory arthritis.  I will follow-up with her on the phone and will refer or follow-up with her afterwards in the office as needed.  We could potentially perform injections if there is no infection.    ** Please excuse any errors in grammar or translation related to this dictation. Voice recognition software was utilized to prepare this document. **       Melvin Palma MD  [facility]

## 2024-11-12 LAB — CRYSTALS FLD MICRO: NORMAL

## 2024-11-15 LAB
BACTERIA FLD CULT: NORMAL
GRAM STN SPEC: NORMAL
GRAM STN SPEC: NORMAL

## 2024-11-18 ENCOUNTER — HOSPITAL ENCOUNTER (OUTPATIENT)
Dept: RADIOLOGY | Facility: EXTERNAL LOCATION | Age: 24
Discharge: HOME | End: 2024-11-18

## 2024-11-18 ENCOUNTER — APPOINTMENT (OUTPATIENT)
Dept: ORTHOPEDIC SURGERY | Age: 24
End: 2024-11-18
Payer: MEDICAID

## 2024-11-18 VITALS — BODY MASS INDEX: 34.55 KG/M2 | HEIGHT: 61 IN | WEIGHT: 183 LBS

## 2024-11-18 DIAGNOSIS — M22.41 CHONDROMALACIA OF BOTH PATELLAE: Primary | ICD-10-CM

## 2024-11-18 DIAGNOSIS — M22.42 CHONDROMALACIA OF BOTH PATELLAE: Primary | ICD-10-CM

## 2024-11-18 DIAGNOSIS — M25.562 LEFT KNEE PAIN, UNSPECIFIED CHRONICITY: ICD-10-CM

## 2024-11-18 DIAGNOSIS — M25.561 RIGHT KNEE PAIN, UNSPECIFIED CHRONICITY: ICD-10-CM

## 2024-11-18 PROCEDURE — 20611 DRAIN/INJ JOINT/BURSA W/US: CPT | Performed by: EMERGENCY MEDICINE

## 2024-11-18 PROCEDURE — 3008F BODY MASS INDEX DOCD: CPT | Performed by: EMERGENCY MEDICINE

## 2024-11-18 PROCEDURE — 1036F TOBACCO NON-USER: CPT | Performed by: EMERGENCY MEDICINE

## 2024-11-18 PROCEDURE — 99213 OFFICE O/P EST LOW 20 MIN: CPT | Performed by: EMERGENCY MEDICINE

## 2024-11-18 RX ORDER — LIDOCAINE HYDROCHLORIDE 10 MG/ML
4 INJECTION, SOLUTION INFILTRATION; PERINEURAL
Status: COMPLETED | OUTPATIENT
Start: 2024-11-18 | End: 2024-11-18

## 2024-11-18 RX ORDER — METHYLPREDNISOLONE ACETATE 40 MG/ML
80 INJECTION, SUSPENSION INTRA-ARTICULAR; INTRALESIONAL; INTRAMUSCULAR; SOFT TISSUE
Status: COMPLETED | OUTPATIENT
Start: 2024-11-18 | End: 2024-11-18

## 2024-11-18 NOTE — PROGRESS NOTES
Patient is here for bilateral knee pain, but has most her pain in the LEFT knee. Patient states that she fell about a month ago and that is when the pain started.     Subjective    Patient ID: Annamaria Mcgrath is a 24 y.o. female.    Chief Complaint: Pain of the Right Knee (Follow up after sending fluid from the knee )     Last Surgery: No surgery found  Last Surgery Date: No surgery found    Annamaria is a 24-year-old female coming in with some acute bilateral knee pain that is worse on the right than the left.  The patient states that she fell down about 3 steps a month ago and landed on both of her knees.  Since that time she has had pretty severe pain that has limited her ability to walk.  She also reports that she is having some mechanical symptoms with grinding in both knees throughout range of motion and bending.  She has noticed that her right knee feels swollen.  Her left knee symptoms have been getting worse with activity and with time but she states that overall her right knee is more severe than the left.  She was seen by an outside facility/provider and was told that this could be rheumatologic.  She was started on meloxicam and referred to rheumatology.  She denies any similar prior injuries or episodes and has never had surgery on her knees. We decided for her to continue taking the meloxicam.  We are also going to pursue a stat MRI of the right knee to look for potential surgical pathology such as a meniscal tear given her symptoms, history, exam findings with an effusion and positive Yolande test.  I do think that she also has a component of bilateral chondromalacia patella.  She was therefore given a prescription for physical therapy and I will likely follow-up with her after the MRI.  If her MRI does not reveal any surgical pathology we could potentially also consider patellar stabilization braces and injections moving forward.    Update on 11/11/2024. Annamaria is coming back in for a follow up visit  regarding her bilateral knee pain that is worse on the right than the left.  She is also here today to follow-up after getting her right knee MRI. She is here today with her mother who is help provide additional history.  The patient states that her symptoms have been getting worse and that she is still having knee pain in both knees with limited range of motion.  We decided to perform an aspiration of the right knee with ultrasound guidance.  The patient tolerated the procedure well without any complications and activity modifications were reviewed.  We are going to send the aspirated fluid to the lab to screen for crystals or infection.  My suspicion is that this is instead an inflammatory arthritis.  I will follow-up with her on the phone and will refer or follow-up with her afterwards in the office as needed.  We could potentially perform injections if there is no infection.    Update on 11/18/2024.  Patient is coming back in for a follow-up visit regarding her bilateral knee pain that is worse on the right than the left and secondary to some patellofemoral chondromalacia and possibly inflammatory polyarthritis.  She is following up with rheumatology.  At her last appointment on 11/11/2024 we did an aspiration of the right knee and the fluid was sent to the lab.  White cells were 13,000.  Not consistent with septic arthritis.  No crystals were seen.  She is therefore coming back in today interested in bilateral knee cortisone injections.        Objective   Right Knee Exam     Muscle Strength   The patient has normal right knee strength.    Tenderness   The patient is experiencing tenderness in the medial joint line and patella.    Range of Motion   Extension:  abnormal   Flexion:  abnormal     Tests   Yolande:  Medial - positive Lateral - negative  Varus: negative Valgus: negative  Lachman:  Anterior - negative      Drawer:  Anterior - negative    Posterior - negative  Patellar apprehension: negative    Other    Erythema: absent  Sensation: normal  Pulse: present  Swelling: mild  Effusion: effusion present    Comments:  Bilateral patellar crepitus throughout range of motion testing.  Positive patellar grind on both sides.  Positive Yolande's with an effusion on the right knee.      Left Knee Exam     Muscle Strength   The patient has normal left knee strength.    Tenderness   The patient is experiencing tenderness in the patella and medial joint line.    Range of Motion   Extension:  normal   Flexion:  abnormal     Tests   Yolande:  Medial - negative Lateral - negative  Varus: negative Valgus: negative  Lachman:  Anterior - negative      Drawer:  Anterior - negative     Posterior - negative  Patellar apprehension: negative    Other   Erythema: absent  Sensation: normal  Pulse: present  Swelling: mild  Effusion: effusion present    Comments:  Trace effusion            Image Results:  No new imaging today    Patient ID: Annamaria Mcgrath is a 24 y.o. female.    L Inj/Asp: bilateral knee on 11/18/2024 1:48 PM  Indications: pain  Details: 22 G needle, ultrasound-guided superolateral approach  Medications (Right): 80 mg methylPREDNISolone acetate 40 mg/mL; 4 mL lidocaine 10 mg/mL (1 %)  Medications (Left): 4 mL lidocaine 10 mg/mL (1 %); 80 mg methylPREDNISolone acetate 40 mg/mL  Outcome: tolerated well, no immediate complications  Procedure, treatment alternatives, risks and benefits explained, specific risks discussed. Consent was given by the patient. Immediately prior to procedure a time out was called to verify the correct patient, procedure, equipment, support staff and site/side marked as required. Patient was prepped and draped in the usual sterile fashion.           Assessment/Plan   Encounter Diagnoses:  Chondromalacia of both patellae    Orders Placed This Encounter    L Inj/Asp     No follow-ups on file.    We again discussed her treatment options and eventually agreed to perform bilateral knee cortisone injections  under ultrasound guidance.  The patient tolerated both procedures well without any complications and activity modifications were reviewed.  She is going to follow-up with me in about 4 weeks to see how she is responding and whether or not we need to pursue viscosupplementation injections but she also knows to continue to follow-up with her rheumatologist as I do think that she has some systemic inflammation or potentially an autoimmune disorder with her polyarthritis.    ** Please excuse any errors in grammar or translation related to this dictation. Voice recognition software was utilized to prepare this document. **       Melvin Palma MD  [facility]

## 2024-12-04 ENCOUNTER — APPOINTMENT (OUTPATIENT)
Dept: RHEUMATOLOGY | Facility: CLINIC | Age: 24
End: 2024-12-04
Payer: MEDICAID

## 2024-12-04 ENCOUNTER — LAB (OUTPATIENT)
Dept: LAB | Facility: LAB | Age: 24
End: 2024-12-04
Payer: MEDICAID

## 2024-12-04 VITALS
BODY MASS INDEX: 34.36 KG/M2 | HEART RATE: 73 BPM | HEIGHT: 61 IN | DIASTOLIC BLOOD PRESSURE: 78 MMHG | WEIGHT: 182 LBS | SYSTOLIC BLOOD PRESSURE: 113 MMHG

## 2024-12-04 DIAGNOSIS — R76.8 ANA POSITIVE: Primary | ICD-10-CM

## 2024-12-04 DIAGNOSIS — M25.50 POLYARTHRALGIA: ICD-10-CM

## 2024-12-04 DIAGNOSIS — M79.18 MYALGIA, MULTIPLE SITES: ICD-10-CM

## 2024-12-04 DIAGNOSIS — R76.8 ANA POSITIVE: ICD-10-CM

## 2024-12-04 DIAGNOSIS — Z83.2 FAMILY HISTORY OF AUTOIMMUNE DISORDER: ICD-10-CM

## 2024-12-04 LAB
B2 GLYCOPROT1 IGA SER-ACNC: 0.7 U/ML
B2 GLYCOPROT1 IGG SER-ACNC: <1.4 U/ML
B2 GLYCOPROT1 IGM SER-ACNC: 2.4 U/ML
C3 SERPL-MCNC: 139 MG/DL (ref 87–200)
C4 SERPL-MCNC: 34 MG/DL (ref 10–50)
CARDIOLIPIN IGA SERPL-ACNC: 0.6 APL U/ML
CARDIOLIPIN IGG SER IA-ACNC: <1.6 GPL U/ML
CARDIOLIPIN IGM SER IA-ACNC: 2.9 MPL U/ML
CCP IGG SERPL-ACNC: <1 U/ML
CK SERPL-CCNC: 60 U/L (ref 0–215)
CRP SERPL-MCNC: 0.95 MG/DL
ERYTHROCYTE [SEDIMENTATION RATE] IN BLOOD BY WESTERGREN METHOD: 32 MM/H (ref 0–20)

## 2024-12-04 PROCEDURE — 82550 ASSAY OF CK (CPK): CPT

## 2024-12-04 PROCEDURE — 86146 BETA-2 GLYCOPROTEIN ANTIBODY: CPT

## 2024-12-04 PROCEDURE — 86140 C-REACTIVE PROTEIN: CPT

## 2024-12-04 PROCEDURE — 3074F SYST BP LT 130 MM HG: CPT | Performed by: INTERNAL MEDICINE

## 2024-12-04 PROCEDURE — 86200 CCP ANTIBODY: CPT

## 2024-12-04 PROCEDURE — 36415 COLL VENOUS BLD VENIPUNCTURE: CPT

## 2024-12-04 PROCEDURE — 99204 OFFICE O/P NEW MOD 45 MIN: CPT | Performed by: INTERNAL MEDICINE

## 2024-12-04 PROCEDURE — 86160 COMPLEMENT ANTIGEN: CPT

## 2024-12-04 PROCEDURE — 81381 HLA I TYPING 1 ALLELE HR: CPT

## 2024-12-04 PROCEDURE — 1036F TOBACCO NON-USER: CPT | Performed by: INTERNAL MEDICINE

## 2024-12-04 PROCEDURE — 86147 CARDIOLIPIN ANTIBODY EA IG: CPT

## 2024-12-04 PROCEDURE — 3078F DIAST BP <80 MM HG: CPT | Performed by: INTERNAL MEDICINE

## 2024-12-04 PROCEDURE — 3008F BODY MASS INDEX DOCD: CPT | Performed by: INTERNAL MEDICINE

## 2024-12-04 PROCEDURE — 85652 RBC SED RATE AUTOMATED: CPT

## 2024-12-04 NOTE — PROGRESS NOTES
Subjective . Annamaria Mcgrath is a 24 y.o. female who presents for Establish Care (Np-pain through joints fluid in the knee ).    HPI.  24-year-old female with history of anxiety, bipolar disorder, schizophrenia, PTSD, obesity, vertebral osteomyelitis, anemia, asthma and PCOS referred for joint pain evaluation.    She stated that she has been having pain in her elbows, shoulders and knees since February 2024.  She states that she was having joint pain most of the time however she would not feel any pain when she was busy and ADLs.  She fell on her knees in October and noticed worsening pain in knees, right more than left.  She is following with orthopedist.  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  MRI of the right knee obtained on November 4 showed moderate-sized knee joint effusion with heterogeneous debris suggestive of synovitis.  There was grade 1 sprain of the proximal medial collateral ligament.  Right knee arthrocentesis was performed and 4 mL of synovial fluid recovered.  Synovial fluid total WBC count was more than 13K and RBC count more than 11K.  Neutrophils 73 and lymphocyte 21.  Synovial fluid crystals and cultures were negative.  She received intra-articular corticosteroid injection.  She states knee pain is better now.    Mother has knee osteoarthritis.    She has chronic back pain that gets worse upon sitting for long period of time, standing, walking and bending over.  She states that she has had vertebral osteomyelitis in 2012 and in 2014.  She follows spine orthopedist and gets corticosteroid injections.    She has no history of pleuritic chest pain, shortness of breath, fever, chills, night sweats, weight loss, dry eyes, dry mouth, skin rash,photosensitivity, mucocutaneous ulcers, hair loss, iritis, uveitis, Raynaud's phenomena psoriasis and inflammatory bowel disease.    Review of Systems   All other systems reviewed and are negative.    Objective     Blood pressure 113/78, pulse 73, height  "1.549 m (5' 1\"), weight 82.6 kg (182 lb).    Physical Exam.  Gen. AAO x3, NAD.  HEENT: No pallor or icterus, PERRLA, EOMI. Oropharynx is clear. MM moist,Parotid glands  not enlarged. No cervical lymphadenopathy .  Skin: No rashes.  Heart: S1, S2/ RRR. No murmurs or gallops.  Lungs: CTA B.  Abdomen: Soft, NT/ND, BS regular.  MSK: No.swelling or tenderness of the MCP, PIP, wrist, elbows, shoulders, ankles and MTP joints.  Neck, spine and SI joint without tenderness.  Bilateral knee with mild patellofemoral crepitation.  No knee erythema, warmth or effusion.  Knee range of motion full.    Neuro: CN II-XII intact. Sensation to touch intact.Strength 5/5 throughout. DTR 2+ and symmetrical.  Psych:Appropriate mood and behavior  EXT: No edema    Assessment/Plan . 24-year-old female with history of anxiety, bipolar disorder, schizophrenia, PTSD, obesity, vertebral osteomyelitis, anemia, asthma and PCOS presented with pain in elbows, shoulders and knees.  Knee pain got worse after fall about 2 months ago.  Blood work obtained in October showed MARILEE at 1: 60 with negative ABIGAIL.  RF negative.  Lyme serology negative.  Uric acid within normal limits.  ESR 36 and CRP 2.64.  MRI of the right knee showed moderate-sized knee effusion with heterogeneous debris suggestive of synovitis and grade 1 sprain of the proximal medial collateral ligament.  Synovial fluid was inflammatory with negative for crystals and culture.      We will obtain labs for further evaluation.  She is to continue meloxicam.  We will communicate with the patient.     This note was partially generated using the Dragon Voice recognition system. There may be some incorrect wording, spelling and/or spelling errors or punctuation errors that were not corrected prior to committing the note to the medical record.      Problem List Items Addressed This Visit    None  Visit Diagnoses       MARILEE positive    -  Primary    Relevant Orders    C-Reactive Protein    Citrulline " Antibody, IgG    C4 Complement    C3 Complement    Sedimentation Rate    HLAB27 Typing    Beta-2 Glycoprotein Antibodies    Anti-Cardiolipin Antibody (IgA, IgG, and IgM)    Creatine Kinase    Polyarthralgia        Relevant Orders    C-Reactive Protein    Citrulline Antibody, IgG    C4 Complement    C3 Complement    Sedimentation Rate    HLAB27 Typing    Beta-2 Glycoprotein Antibodies    Anti-Cardiolipin Antibody (IgA, IgG, and IgM)    Creatine Kinase    Myalgia, multiple sites        Relevant Orders    C-Reactive Protein    Citrulline Antibody, IgG    C4 Complement    C3 Complement    Sedimentation Rate    HLAB27 Typing    Beta-2 Glycoprotein Antibodies    Anti-Cardiolipin Antibody (IgA, IgG, and IgM)    Creatine Kinase    Family history of autoimmune disorder                     Active Ambulatory Problems     Diagnosis Date Noted    Acute bronchitis 02/06/2023    Allergy 02/06/2023    Anemia 02/06/2023    Anxiety disorder 02/06/2023    Chronic low back pain 02/06/2023    Depression 02/06/2023    Diffuse abdominal pain 02/06/2023    DUB (dysfunctional uterine bleeding) 02/06/2023    Genital lesion, female 02/06/2023    Group B Streptococcus carrier, antepartum ([facility]) 02/06/2023    Heartburn 02/06/2023    Headache 02/06/2023    High blood pressure 02/06/2023    Other headache syndrome 02/06/2023    Otitis media 02/06/2023    Otomycosis 02/06/2023    PCOS (polycystic ovarian syndrome) 02/06/2023    Pelvic pain in female 02/06/2023    Pre-existing secondary hypertension during pregnancy, antepartum ([facility]) 02/06/2023    Bipolar affective disorder, depressed, severe, with psychotic behavior ([facility]) 02/06/2023    Vaginal irritation 02/06/2023    Schizophrenia 02/06/2023    Vitamin D deficiency 02/06/2023    Brain fog 02/06/2023    Fatigue 02/06/2023    Mild intermittent asthma 02/06/2023    PTSD (post-traumatic stress disorder) 02/06/2023    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    Acute pain of right knee 10/31/2024      Resolved Ambulatory Problems     Diagnosis Date Noted    No Resolved Ambulatory Problems     Past Medical History:   Diagnosis Date    13 weeks gestation of pregnancy ([facility]) 11/01/2022    18 weeks gestation of pregnancy ([facility]) 11/01/2022    Acute upper respiratory infection, unspecified 07/11/2022    Acute vaginitis 11/01/2022    Contact with and (suspected) exposure to covid-19 11/01/2022    Contact with and (suspected) exposure to infections with a predominantly sexual mode of transmission 11/01/2022    Encounter for gynecological examination (general) (routine) without abnormal findings 01/26/2022    Encounter for screening for infections with a predominantly sexual mode of transmission 11/01/2022    Encounter for supervision of normal pregnancy, unspecified, first trimester 11/01/2022    Encounter for supervision of normal pregnancy, unspecified, second trimester 11/01/2022    Encounter for supervision of normal pregnancy, unspecified, third trimester 08/15/2022    Gestational (pregnancy-induced) hypertension without significant proteinuria, unspecified trimester ([facility]) 11/01/2022    Infection of obstetric surgical wound, unspecified ([facility]) 11/01/2022    Other conditions influencing health status 11/01/2022    Other conditions influencing health status 11/01/2022    Other injury of unspecified body region, initial encounter 11/01/2022    Personal history of other diseases of the respiratory system 11/01/2022    Personal history of other specified conditions 11/01/2022    Unspecified maternal hypertension, unspecified trimester ([facility]) 11/01/2022       Family History   Problem Relation Name Age of Onset    Other (cardiac disorder) Mother      Depression Mother      Mental illness Mother      Other (cardiac disorder) Sister      Other (cardiac disorder) Brother      Asthma Maternal Grandmother      Depression Maternal Grandmother      Other (Gallbladder cancer) Maternal Grandmother      Mental  illness Maternal Grandmother      Other (cardiac disorder) Maternal Grandmother      Other (cardiac disorder) Paternal Grandfather      Other (cardiac disorder) Sibling      Depression Sibling      Mental illness Sibling         Past Surgical History:   Procedure Laterality Date    OTHER SURGICAL HISTORY  05/18/2021    Tumor excision    OTHER SURGICAL HISTORY  05/18/2021    [place] tooth extraction    OTHER SURGICAL HISTORY  01/26/2022    Tonsillectomy with adenoidectomy       Social History     Tobacco Use   Smoking Status Former    Types: Cigarettes   Smokeless Tobacco Never       Allergies  Mushroom and Ziprasidone hcl    Current Meds  Current Outpatient Medications   Medication Instructions    acetaminophen (Tylenol) 325 mg tablet TAKE TWO TABLETS BY MOUTH EVERY 6 HOURS AS NEEDED    albuterol 90 mcg/actuation inhaler 2 puffs, Every 4 hours PRN    busPIRone (BUSPAR) 15 mg, 2 times daily    clomiPRAMINE (ANAFRANIL) 50 mg, Nightly    fluticasone (Flovent) 44 mcg/actuation inhaler 2 puffs, 2 times daily RT    gabapentin (NEURONTIN) 300 mg, oral, 2 times daily    lurasidone (Latuda) 20 mg tablet Take by mouth.    meloxicam (Mobic) 15 mg tablet     naproxen (NAPROSYN) 500 mg, oral, 2 times daily PRN    norelgestromin-ethin.estradioL (Xulane) 150-35 mcg/24 hr Place on the skin. APPLY 1 PATCH WEEKLY AS DIRECTED x 3 WEEKS, THEN ONE WEEK WITH NO PATCH    valACYclovir (VALTREX) 500 mg, oral, Daily        Lab Results   Component Value Date    ANATITER 1:160 10/10/2024    RF <10 10/10/2024    SEDRATE 36 (H) 10/10/2024    CRP 2.64 (H) 10/10/2024    URICACID 4.8 10/10/2024    DNADS 2.0 10/10/2024        Procedures     Cody Leon MD

## 2024-12-04 NOTE — LETTER
December 4, 2024     Tariq Parker MD  [address]  [facility]  [facility] [address]    Patient: Annamaria Mcgrath   YOB: 2000   Date of Visit: 12/4/2024       Dear Dr. Tariq Parker MD:    Thank you for referring Annamaria Mcgrath to me for evaluation. Below are my notes for this consultation.  If you have questions, please do not hesitate to call me. I look forward to following your patient along with you.       Sincerely,     Cody Leon MD      CC: No Recipients  ______________________________________________________________________________________    Subjective. Annamaria Mcgrath is a 24 y.o. female who presents for Establish Care (Np-pain through joints fluid in the knee ).    HPI.  24-year-old female with history of anxiety, bipolar disorder, schizophrenia, PTSD, obesity, vertebral osteomyelitis, anemia, asthma and PCOS referred for joint pain evaluation.    She stated that she has been having pain in her elbows, shoulders and knees since February 2024.  She states that she was having joint pain most of the time however she would not feel any pain when she was busy and ADLs.  She fell on her knees in October and noticed worsening pain in knees, right more than left.  She is following with orthopedist.  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  MRI of the right knee obtained on November 4 showed moderate-sized knee joint effusion with heterogeneous debris suggestive of synovitis.  There was grade 1 sprain of the proximal medial collateral ligament.  Right knee arthrocentesis was performed and 4 mL of synovial fluid recovered.  Synovial fluid total WBC count was more than 13K and RBC count more than 11K.  Neutrophils 73 and lymphocyte 21.  Synovial fluid crystals and cultures were negative.  She received intra-articular corticosteroid injection.  She states knee pain is better now.    Mother has knee osteoarthritis.    She has chronic back pain that gets worse upon sitting for long  "period of time, standing, walking and bending over.  She states that she has had vertebral osteomyelitis in 2012 and in 2014.  She follows spine orthopedist and gets corticosteroid injections.    She has no history of pleuritic chest pain, shortness of breath, fever, chills, night sweats, weight loss, dry eyes, dry mouth, skin rash,photosensitivity, mucocutaneous ulcers, hair loss, iritis, uveitis, Raynaud's phenomena psoriasis and inflammatory bowel disease.    Review of Systems   All other systems reviewed and are negative.    Objective    Blood pressure 113/78, pulse 73, height 1.549 m (5' 1\"), weight 82.6 kg (182 lb).    Physical Exam.  Gen. AAO x3, NAD.  HEENT: No pallor or icterus, PERRLA, EOMI. Oropharynx is clear. MM moist,Parotid glands  not enlarged. No cervical lymphadenopathy .  Skin: No rashes.  Heart: S1, S2/ RRR. No murmurs or gallops.  Lungs: CTA B.  Abdomen: Soft, NT/ND, BS regular.  MSK: No.swelling or tenderness of the MCP, PIP, wrist, elbows, shoulders, ankles and MTP joints.  Neck, spine and SI joint without tenderness.  Bilateral knee with mild patellofemoral crepitation.  No knee erythema, warmth or effusion.  Knee range of motion full.    Neuro: CN II-XII intact. Sensation to touch intact.Strength 5/5 throughout. DTR 2+ and symmetrical.  Psych:Appropriate mood and behavior  EXT: No edema    Assessment/Plan. 24-year-old female with history of anxiety, bipolar disorder, schizophrenia, PTSD, obesity, vertebral osteomyelitis, anemia, asthma and PCOS presented with pain in elbows, shoulders and knees.  Knee pain got worse after fall about 2 months ago.  Blood work obtained in October showed MARILEE at 1: 60 with negative ABIGAIL.  RF negative.  Lyme serology negative.  Uric acid within normal limits.  ESR 36 and CRP 2.64.  MRI of the right knee showed moderate-sized knee effusion with heterogeneous debris suggestive of synovitis and grade 1 sprain of the proximal medial collateral ligament.  Synovial " fluid was inflammatory with negative for crystals and culture.      We will obtain labs for further evaluation.  She is to continue meloxicam.  We will communicate with the patient.     This note was partially generated using the Dragon Voice recognition system. There may be some incorrect wording, spelling and/or spelling errors or punctuation errors that were not corrected prior to committing the note to the medical record.      Problem List Items Addressed This Visit    None  Visit Diagnoses       MARILEE positive    -  Primary    Relevant Orders    C-Reactive Protein    Citrulline Antibody, IgG    C4 Complement    C3 Complement    Sedimentation Rate    HLAB27 Typing    Beta-2 Glycoprotein Antibodies    Anti-Cardiolipin Antibody (IgA, IgG, and IgM)    Creatine Kinase    Polyarthralgia        Relevant Orders    C-Reactive Protein    Citrulline Antibody, IgG    C4 Complement    C3 Complement    Sedimentation Rate    HLAB27 Typing    Beta-2 Glycoprotein Antibodies    Anti-Cardiolipin Antibody (IgA, IgG, and IgM)    Creatine Kinase    Myalgia, multiple sites        Relevant Orders    C-Reactive Protein    Citrulline Antibody, IgG    C4 Complement    C3 Complement    Sedimentation Rate    HLAB27 Typing    Beta-2 Glycoprotein Antibodies    Anti-Cardiolipin Antibody (IgA, IgG, and IgM)    Creatine Kinase    Family history of autoimmune disorder                     Active Ambulatory Problems     Diagnosis Date Noted   • Acute bronchitis 02/06/2023   • Allergy 02/06/2023   • Anemia 02/06/2023   • Anxiety disorder 02/06/2023   • Chronic low back pain 02/06/2023   • Depression 02/06/2023   • Diffuse abdominal pain 02/06/2023   • DUB (dysfunctional uterine bleeding) 02/06/2023   • Genital lesion, female 02/06/2023   • Group B Streptococcus carrier, antepartum ([facility]) 02/06/2023   • Heartburn 02/06/2023   • Headache 02/06/2023   • High blood pressure 02/06/2023   • Other headache syndrome 02/06/2023   • Otitis media 02/06/2023    • Otomycosis 02/06/2023   • PCOS (polycystic ovarian syndrome) 02/06/2023   • Pelvic pain in female 02/06/2023   • Pre-existing secondary hypertension during pregnancy, antepartum ([facility]) 02/06/2023   • Bipolar affective disorder, depressed, severe, with psychotic behavior ([facility]) 02/06/2023   • Vaginal irritation 02/06/2023   • Schizophrenia 02/06/2023   • Vitamin D deficiency 02/06/2023   • Brain fog 02/06/2023   • Fatigue 02/06/2023   • Mild intermittent asthma 02/06/2023   • PTSD (post-traumatic stress disorder) 02/06/2023   • 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   • Acute pain of right knee 10/31/2024     Resolved Ambulatory Problems     Diagnosis Date Noted   • No Resolved Ambulatory Problems     Past Medical History:   Diagnosis Date   • 13 weeks gestation of pregnancy ([facility]) 11/01/2022   • 18 weeks gestation of pregnancy ([facility]) 11/01/2022   • Acute upper respiratory infection, unspecified 07/11/2022   • Acute vaginitis 11/01/2022   • Contact with and (suspected) exposure to covid-19 11/01/2022   • Contact with and (suspected) exposure to infections with a predominantly sexual mode of transmission 11/01/2022   • Encounter for gynecological examination (general) (routine) without abnormal findings 01/26/2022   • Encounter for screening for infections with a predominantly sexual mode of transmission 11/01/2022   • Encounter for supervision of normal pregnancy, unspecified, first trimester 11/01/2022   • Encounter for supervision of normal pregnancy, unspecified, second trimester 11/01/2022   • Encounter for supervision of normal pregnancy, unspecified, third trimester 08/15/2022   • Gestational (pregnancy-induced) hypertension without significant proteinuria, unspecified trimester ([facility]) 11/01/2022   • Infection of obstetric surgical wound, unspecified ([facility]) 11/01/2022   • Other conditions influencing health status 11/01/2022   • Other conditions influencing health status 11/01/2022   • Other injury  of unspecified body region, initial encounter 11/01/2022   • Personal history of other diseases of the respiratory system 11/01/2022   • Personal history of other specified conditions 11/01/2022   • Unspecified maternal hypertension, unspecified trimester ([facility]) 11/01/2022       Family History   Problem Relation Name Age of Onset   • Other (cardiac disorder) Mother     • Depression Mother     • Mental illness Mother     • Other (cardiac disorder) Sister     • Other (cardiac disorder) Brother     • Asthma Maternal Grandmother     • Depression Maternal Grandmother     • Other (Gallbladder cancer) Maternal Grandmother     • Mental illness Maternal Grandmother     • Other (cardiac disorder) Maternal Grandmother     • Other (cardiac disorder) Paternal Grandfather     • Other (cardiac disorder) Sibling     • Depression Sibling     • Mental illness Sibling         Past Surgical History:   Procedure Laterality Date   • OTHER SURGICAL HISTORY  05/18/2021    Tumor excision   • OTHER SURGICAL HISTORY  05/18/2021    [place] tooth extraction   • OTHER SURGICAL HISTORY  01/26/2022    Tonsillectomy with adenoidectomy       Social History     Tobacco Use   Smoking Status Former   • Types: Cigarettes   Smokeless Tobacco Never       Allergies  Mushroom and Ziprasidone hcl    Current Meds  Current Outpatient Medications   Medication Instructions   • acetaminophen (Tylenol) 325 mg tablet TAKE TWO TABLETS BY MOUTH EVERY 6 HOURS AS NEEDED   • albuterol 90 mcg/actuation inhaler 2 puffs, Every 4 hours PRN   • busPIRone (BUSPAR) 15 mg, 2 times daily   • clomiPRAMINE (ANAFRANIL) 50 mg, Nightly   • fluticasone (Flovent) 44 mcg/actuation inhaler 2 puffs, 2 times daily RT   • gabapentin (NEURONTIN) 300 mg, oral, 2 times daily   • lurasidone (Latuda) 20 mg tablet Take by mouth.   • meloxicam (Mobic) 15 mg tablet    • naproxen (NAPROSYN) 500 mg, oral, 2 times daily PRN   • norelgestromin-ethin.estradioL (Xulane) 150-35 mcg/24 hr Place on the  skin. APPLY 1 PATCH WEEKLY AS DIRECTED x 3 WEEKS, THEN ONE WEEK WITH NO PATCH   • valACYclovir (VALTREX) 500 mg, oral, Daily        Lab Results   Component Value Date    ANATITER 1:160 10/10/2024    RF <10 10/10/2024    SEDRATE 36 (H) 10/10/2024    CRP 2.64 (H) 10/10/2024    URICACID 4.8 10/10/2024    DNADS 2.0 10/10/2024        Procedures     Cody Leon MD

## 2024-12-10 LAB — HLAB27 TYPING: NEGATIVE

## 2024-12-16 ENCOUNTER — APPOINTMENT (OUTPATIENT)
Dept: ORTHOPEDIC SURGERY | Age: 24
End: 2024-12-16
Payer: MEDICAID

## 2024-12-18 ENCOUNTER — APPOINTMENT (OUTPATIENT)
Dept: PRIMARY CARE | Facility: CLINIC | Age: 24
End: 2024-12-18
Payer: MEDICAID

## 2025-01-03 ENCOUNTER — APPOINTMENT (OUTPATIENT)
Dept: ORTHOPEDIC SURGERY | Facility: CLINIC | Age: 25
End: 2025-01-03
Payer: MEDICAID

## 2025-01-03 ENCOUNTER — TELEPHONE (OUTPATIENT)
Dept: ORTHOPEDIC SURGERY | Facility: CLINIC | Age: 25
End: 2025-01-03

## 2025-01-03 VITALS — WEIGHT: 180 LBS | BODY MASS INDEX: 33.99 KG/M2 | HEIGHT: 61 IN

## 2025-01-03 DIAGNOSIS — M22.41 CHONDROMALACIA OF BOTH PATELLAE: Primary | ICD-10-CM

## 2025-01-03 DIAGNOSIS — M22.42 CHONDROMALACIA OF BOTH PATELLAE: Primary | ICD-10-CM

## 2025-01-03 PROCEDURE — 3008F BODY MASS INDEX DOCD: CPT | Performed by: EMERGENCY MEDICINE

## 2025-01-03 PROCEDURE — 1036F TOBACCO NON-USER: CPT | Performed by: EMERGENCY MEDICINE

## 2025-01-03 PROCEDURE — 99214 OFFICE O/P EST MOD 30 MIN: CPT | Performed by: EMERGENCY MEDICINE

## 2025-01-03 ASSESSMENT — PAIN SCALES - GENERAL: PAINLEVEL_OUTOF10: 6

## 2025-01-03 ASSESSMENT — ENCOUNTER SYMPTOMS: KNEE SWELLING: 1

## 2025-01-03 ASSESSMENT — PAIN - FUNCTIONAL ASSESSMENT: PAIN_FUNCTIONAL_ASSESSMENT: 0-10

## 2025-01-03 NOTE — PROGRESS NOTES
Patient is here for bilateral knee pain, but has most her pain in the LEFT knee. Patient states that she fell about a month ago and that is when the pain started.     Subjective    Patient ID: Annamaria Mcgrath is a 24 y.o. female.    Chief Complaint: Pain of the Right Knee (F/U B/L knee CSI done 11/18/2024 which helped for 2 weeks. She has seen a rheumatologist who recommended she lower her BMI. She is now having pain and swelling in her right ankle and left elbow. No new inuries. )     Last Surgery: No surgery found  Last Surgery Date: No surgery found    Annamaria is a 24-year-old female coming in with some acute bilateral knee pain that is worse on the right than the left.  The patient states that she fell down about 3 steps a month ago and landed on both of her knees.  Since that time she has had pretty severe pain that has limited her ability to walk.  She also reports that she is having some mechanical symptoms with grinding in both knees throughout range of motion and bending.  She has noticed that her right knee feels swollen.  Her left knee symptoms have been getting worse with activity and with time but she states that overall her right knee is more severe than the left.  She was seen by an outside facility/provider and was told that this could be rheumatologic.  She was started on meloxicam and referred to rheumatology.  She denies any similar prior injuries or episodes and has never had surgery on her knees. We decided for her to continue taking the meloxicam.  We are also going to pursue a stat MRI of the right knee to look for potential surgical pathology such as a meniscal tear given her symptoms, history, exam findings with an effusion and positive Yolande test.  I do think that she also has a component of bilateral chondromalacia patella.  She was therefore given a prescription for physical therapy and I will likely follow-up with her after the MRI.  If her MRI does not reveal any surgical pathology we  could potentially also consider patellar stabilization braces and injections moving forward.    Update on 11/11/2024. Annamaria is coming back in for a follow up visit regarding her bilateral knee pain that is worse on the right than the left.  She is also here today to follow-up after getting her right knee MRI. She is here today with her mother who is help provide additional history.  The patient states that her symptoms have been getting worse and that she is still having knee pain in both knees with limited range of motion.  We decided to perform an aspiration of the right knee with ultrasound guidance.  The patient tolerated the procedure well without any complications and activity modifications were reviewed.  We are going to send the aspirated fluid to the lab to screen for crystals or infection.  My suspicion is that this is instead an inflammatory arthritis.  I will follow-up with her on the phone and will refer or follow-up with her afterwards in the office as needed.  We could potentially perform injections if there is no infection.    Update on 11/18/2024.  Patient is coming back in for a follow-up visit regarding her bilateral knee pain that is worse on the right than the left and secondary to some patellofemoral chondromalacia and possibly inflammatory polyarthritis.  She is following up with rheumatology.  At her last appointment on 11/11/2024 we did an aspiration of the right knee and the fluid was sent to the lab.  White cells were 13,000.  Not consistent with septic arthritis.  No crystals were seen.  She is therefore coming back in today interested in bilateral knee cortisone injections. We agreed to perform bilateral knee cortisone injections under ultrasound guidance.  The patient tolerated both procedures well without any complications and activity modifications were reviewed.  She is going to follow-up with me in about 4 weeks to see how she is responding and whether or not we need to pursue  viscosupplementation injections but she also knows to continue to follow-up with her rheumatologist as I do think that she has some systemic inflammation or potentially an autoimmune disorder with her polyarthritis.    Update on 1/3/2025.  Patient is coming back in for a follow-up visit regarding her bilateral knee pain.  We did a cortisone injection in each knee on 11/18/2024 and she experienced extremely good pain relief but only for a couple of weeks and then the pain began to return.  Currently she is no longer experiencing any relief from the injections.  She did see rheumatology and had a frustrating appointment where reportedly she was told that most of her polyarticular symptoms are because of her weight.  She denies any trauma.  No other complaints today.    Right Knee         Objective   Right Knee Exam     Muscle Strength   The patient has normal right knee strength.    Tenderness   The patient is experiencing tenderness in the medial joint line and patella.    Range of Motion   Extension:  abnormal   Flexion:  abnormal     Tests   Yolande:  Medial - positive Lateral - negative  Varus: negative Valgus: negative  Lachman:  Anterior - negative      Drawer:  Anterior - negative    Posterior - negative  Patellar apprehension: negative    Other   Erythema: absent  Sensation: normal  Pulse: present  Swelling: mild  Effusion: effusion present    Comments:  Trace effusion      Left Knee Exam     Muscle Strength   The patient has normal left knee strength.    Tenderness   The patient is experiencing tenderness in the patella and medial joint line.    Range of Motion   Extension:  normal   Flexion:  abnormal     Tests   Yolande:  Medial - negative Lateral - negative  Varus: negative Valgus: negative  Lachman:  Anterior - negative      Drawer:  Anterior - negative     Posterior - negative  Patellar apprehension: negative    Other   Erythema: absent  Sensation: normal  Pulse: present  Swelling: mild  Effusion:  effusion present    Comments:  Trace effusion            Image Results:  No new imaging today    Patient ID: Annamaria Mcgrath is a 24 y.o. female.    Procedures    Assessment/Plan   Encounter Diagnoses:  Chondromalacia of both patellae    No orders of the defined types were placed in this encounter.    No follow-ups on file.    We again discussed her treatment options and eventually agreed to order gel injections for her knees since she only responded to the steroid injections for about 2 weeks.  In addition, I suggested that she get a another opinion from a different rheumatologist since she is having polyarticular symptoms and gets rashes intermittently.  I really do think that she has an autoimmune process going on.  She is therefore going to follow-up with Maryse Rice at [facility].  She is then going to see me once her gel injections arrive.    ** Please excuse any errors in grammar or translation related to this dictation. Voice recognition software was utilized to prepare this document. **       Melvin Palma MD  [facility]

## 2025-01-23 ENCOUNTER — APPOINTMENT (OUTPATIENT)
Dept: PRIMARY CARE | Facility: CLINIC | Age: 25
End: 2025-01-23
Payer: MEDICAID

## 2025-01-23 VITALS
RESPIRATION RATE: 20 BRPM | DIASTOLIC BLOOD PRESSURE: 64 MMHG | HEIGHT: 61 IN | SYSTOLIC BLOOD PRESSURE: 124 MMHG | HEART RATE: 80 BPM | OXYGEN SATURATION: 97 % | WEIGHT: 183.9 LBS | TEMPERATURE: 97.1 F | BODY MASS INDEX: 34.72 KG/M2

## 2025-01-23 DIAGNOSIS — M25.50 POLYARTHRALGIA: ICD-10-CM

## 2025-01-23 DIAGNOSIS — G89.29 CHRONIC BILATERAL LOW BACK PAIN, UNSPECIFIED WHETHER SCIATICA PRESENT: ICD-10-CM

## 2025-01-23 DIAGNOSIS — M25.561 PAIN IN BOTH KNEES, UNSPECIFIED CHRONICITY: ICD-10-CM

## 2025-01-23 DIAGNOSIS — F20.9 SCHIZOPHRENIA, UNSPECIFIED TYPE: ICD-10-CM

## 2025-01-23 DIAGNOSIS — M54.9 CHRONIC MIDLINE BACK PAIN, UNSPECIFIED BACK LOCATION: Primary | ICD-10-CM

## 2025-01-23 DIAGNOSIS — F33.1 MODERATE EPISODE OF RECURRENT MAJOR DEPRESSIVE DISORDER: ICD-10-CM

## 2025-01-23 DIAGNOSIS — M25.562 PAIN IN BOTH KNEES, UNSPECIFIED CHRONICITY: ICD-10-CM

## 2025-01-23 DIAGNOSIS — B00.1 RECURRENT COLD SORES: ICD-10-CM

## 2025-01-23 DIAGNOSIS — G89.29 CHRONIC MIDLINE BACK PAIN, UNSPECIFIED BACK LOCATION: Primary | ICD-10-CM

## 2025-01-23 DIAGNOSIS — J45.20 MILD INTERMITTENT ASTHMA WITHOUT COMPLICATION (HHS-HCC): ICD-10-CM

## 2025-01-23 DIAGNOSIS — M54.50 CHRONIC BILATERAL LOW BACK PAIN, UNSPECIFIED WHETHER SCIATICA PRESENT: ICD-10-CM

## 2025-01-23 PROCEDURE — 1036F TOBACCO NON-USER: CPT | Performed by: STUDENT IN AN ORGANIZED HEALTH CARE EDUCATION/TRAINING PROGRAM

## 2025-01-23 PROCEDURE — 3074F SYST BP LT 130 MM HG: CPT | Performed by: STUDENT IN AN ORGANIZED HEALTH CARE EDUCATION/TRAINING PROGRAM

## 2025-01-23 PROCEDURE — 96372 THER/PROPH/DIAG INJ SC/IM: CPT | Performed by: STUDENT IN AN ORGANIZED HEALTH CARE EDUCATION/TRAINING PROGRAM

## 2025-01-23 PROCEDURE — 99215 OFFICE O/P EST HI 40 MIN: CPT | Performed by: STUDENT IN AN ORGANIZED HEALTH CARE EDUCATION/TRAINING PROGRAM

## 2025-01-23 PROCEDURE — 3078F DIAST BP <80 MM HG: CPT | Performed by: STUDENT IN AN ORGANIZED HEALTH CARE EDUCATION/TRAINING PROGRAM

## 2025-01-23 PROCEDURE — 3008F BODY MASS INDEX DOCD: CPT | Performed by: STUDENT IN AN ORGANIZED HEALTH CARE EDUCATION/TRAINING PROGRAM

## 2025-01-23 RX ORDER — VALACYCLOVIR HYDROCHLORIDE 500 MG/1
500 TABLET, FILM COATED ORAL DAILY
Qty: 30 TABLET | Refills: 1 | Status: SHIPPED | OUTPATIENT
Start: 2025-01-23

## 2025-01-23 RX ORDER — GABAPENTIN 300 MG/1
300 CAPSULE ORAL 2 TIMES DAILY
Qty: 60 CAPSULE | Refills: 2 | Status: SHIPPED | OUTPATIENT
Start: 2025-01-23

## 2025-01-23 RX ORDER — MELOXICAM 15 MG/1
15 TABLET ORAL DAILY PRN
Qty: 30 TABLET | Refills: 1 | Status: SHIPPED | OUTPATIENT
Start: 2025-01-23

## 2025-01-23 RX ORDER — LURASIDONE HYDROCHLORIDE 120 MG/1
120 TABLET, FILM COATED ORAL
COMMUNITY
Start: 2024-10-30

## 2025-01-23 RX ORDER — TRIAMCINOLONE ACETONIDE 40 MG/ML
40 INJECTION, SUSPENSION INTRA-ARTICULAR; INTRAMUSCULAR ONCE
Status: COMPLETED | OUTPATIENT
Start: 2025-01-23 | End: 2025-01-23

## 2025-01-23 RX ADMIN — TRIAMCINOLONE ACETONIDE 40 MG: 40 INJECTION, SUSPENSION INTRA-ARTICULAR; INTRAMUSCULAR at 16:39

## 2025-01-23 SDOH — ECONOMIC STABILITY: FOOD INSECURITY: WITHIN THE PAST 12 MONTHS, THE FOOD YOU BOUGHT JUST DIDN'T LAST AND YOU DIDN'T HAVE MONEY TO GET MORE.: NEVER TRUE

## 2025-01-23 SDOH — ECONOMIC STABILITY: FOOD INSECURITY: WITHIN THE PAST 12 MONTHS, YOU WORRIED THAT YOUR FOOD WOULD RUN OUT BEFORE YOU GOT MONEY TO BUY MORE.: NEVER TRUE

## 2025-01-23 ASSESSMENT — ANXIETY QUESTIONNAIRES
GAD7 TOTAL SCORE: 20
IF YOU CHECKED OFF ANY PROBLEMS ON THIS QUESTIONNAIRE, HOW DIFFICULT HAVE THESE PROBLEMS MADE IT FOR YOU TO DO YOUR WORK, TAKE CARE OF THINGS AT HOME, OR GET ALONG WITH OTHER PEOPLE: VERY DIFFICULT
3. WORRYING TOO MUCH ABOUT DIFFERENT THINGS: NEARLY EVERY DAY
2. NOT BEING ABLE TO STOP OR CONTROL WORRYING: NEARLY EVERY DAY
6. BECOMING EASILY ANNOYED OR IRRITABLE: NEARLY EVERY DAY
1. FEELING NERVOUS, ANXIOUS, OR ON EDGE: NEARLY EVERY DAY
7. FEELING AFRAID AS IF SOMETHING AWFUL MIGHT HAPPEN: NEARLY EVERY DAY
5. BEING SO RESTLESS THAT IT IS HARD TO SIT STILL: MORE THAN HALF THE DAYS
4. TROUBLE RELAXING: NEARLY EVERY DAY

## 2025-01-23 ASSESSMENT — PATIENT HEALTH QUESTIONNAIRE - PHQ9
7. TROUBLE CONCENTRATING ON THINGS, SUCH AS READING THE NEWSPAPER OR WATCHING TELEVISION: NEARLY EVERY DAY
4. FEELING TIRED OR HAVING LITTLE ENERGY: NEARLY EVERY DAY
9. THOUGHTS THAT YOU WOULD BE BETTER OFF DEAD, OR OF HURTING YOURSELF: NOT AT ALL
10. IF YOU CHECKED OFF ANY PROBLEMS, HOW DIFFICULT HAVE THESE PROBLEMS MADE IT FOR YOU TO DO YOUR WORK, TAKE CARE OF THINGS AT HOME, OR GET ALONG WITH OTHER PEOPLE: VERY DIFFICULT
6. FEELING BAD ABOUT YOURSELF - OR THAT YOU ARE A FAILURE OR HAVE LET YOURSELF OR YOUR FAMILY DOWN: NEARLY EVERY DAY
SUM OF ALL RESPONSES TO PHQ9 QUESTIONS 1 & 2: 6
1. LITTLE INTEREST OR PLEASURE IN DOING THINGS: NEARLY EVERY DAY
8. MOVING OR SPEAKING SO SLOWLY THAT OTHER PEOPLE COULD HAVE NOTICED. OR THE OPPOSITE, BEING SO FIGETY OR RESTLESS THAT YOU HAVE BEEN MOVING AROUND A LOT MORE THAN USUAL: SEVERAL DAYS
2. FEELING DOWN, DEPRESSED OR HOPELESS: NEARLY EVERY DAY
SUM OF ALL RESPONSES TO PHQ QUESTIONS 1-9: 20
5. POOR APPETITE OR OVEREATING: MORE THAN HALF THE DAYS
3. TROUBLE FALLING OR STAYING ASLEEP: MORE THAN HALF THE DAYS

## 2025-01-23 ASSESSMENT — ENCOUNTER SYMPTOMS
CHILLS: 0
COLOR CHANGE: 0
NAUSEA: 0
CONFUSION: 0
FATIGUE: 0
SHORTNESS OF BREATH: 0
DIZZINESS: 0
VOMITING: 0
DIARRHEA: 0
LOSS OF SENSATION IN FEET: 1
ABDOMINAL PAIN: 0
FEVER: 0
WHEEZING: 0
CONSTIPATION: 0
UNEXPECTED WEIGHT CHANGE: 0
PALPITATIONS: 0
DEPRESSION: 0
OCCASIONAL FEELINGS OF UNSTEADINESS: 0
SLEEP DISTURBANCE: 0
HEADACHES: 0
ARTHRALGIAS: 1
BACK PAIN: 1
NERVOUS/ANXIOUS: 0
COUGH: 0

## 2025-01-23 ASSESSMENT — LIFESTYLE VARIABLES
HOW OFTEN DO YOU HAVE A DRINK CONTAINING ALCOHOL: NEVER
AUDIT-C TOTAL SCORE: 0
HOW OFTEN DO YOU HAVE SIX OR MORE DRINKS ON ONE OCCASION: NEVER
HOW MANY STANDARD DRINKS CONTAINING ALCOHOL DO YOU HAVE ON A TYPICAL DAY: PATIENT DOES NOT DRINK
SKIP TO QUESTIONS 9-10: 1

## 2025-01-23 ASSESSMENT — PAIN SCALES - GENERAL: PAINLEVEL_OUTOF10: 8

## 2025-01-23 NOTE — PROGRESS NOTES
Subjective   Patient ID: Annamaria Mcgrath is a 24 y.o. female who presents for Follow-up (4 month follow up/Would like to let you know what happening with Rheumatology.  Went to one Rheumatologist but didn't like them and is seeing a different Rheumatologist in February 6, 2025  ), medication (Would like to get a higher dose of Mobic ), and Anxiety (Patient feels like her anxiety and depression have gotten worse in the last few months).    HPI   She is here for follow-up visit.  Reports she went to see rheumatologist for polyarthralgia but did not like the appointment and will be seeing a new rheumatologist on February 6, 2025 as referred by Dr. Barillas.  She is currently taking Mobic for polyarthralgia with some help. Also taking gabapentin intermittently for back pain. Reports she is having worsening back & bl knee pain; feels stiffed and difficulty to walk; also reports elbow & wrist pain and feels weak d/t pain.   Reports d/t worsening of pain, her anxiety and depression are sl worse lately, she is following with therapist and will be setting up new appt with psych soon, recent appt was cancelled. Reports she is a single mother, has young dtr and wants to be strong for her. Denies SI/HI now or in the past.     Review of Systems   Constitutional:  Negative for chills, fatigue, fever and unexpected weight change.   HENT: Negative.     Respiratory:  Negative for cough, shortness of breath and wheezing.    Cardiovascular:  Negative for chest pain, palpitations and leg swelling.   Gastrointestinal:  Negative for abdominal pain, constipation, diarrhea, nausea and vomiting.   Musculoskeletal:  Positive for arthralgias and back pain.   Skin:  Negative for color change and rash.   Neurological:  Negative for dizziness and headaches.   Psychiatric/Behavioral:  Negative for behavioral problems, confusion, self-injury, sleep disturbance and suicidal ideas. The patient is not nervous/anxious.        Objective   /64 (BP  "Location: Right arm, Patient Position: Sitting, BP Cuff Size: Adult)   Pulse 80   Temp 36.2 °C (97.1 °F) (Temporal)   Resp 20   Ht 1.549 m (5' 1\")   Wt 83.4 kg (183 lb 14.4 oz)   SpO2 97%   BMI 34.75 kg/m²     Physical Exam  Vitals and nursing note reviewed.   Constitutional:       Appearance: Normal appearance.   Cardiovascular:      Rate and Rhythm: Normal rate and regular rhythm.      Pulses: Normal pulses.      Heart sounds: Normal heart sounds.   Pulmonary:      Effort: Pulmonary effort is normal.      Breath sounds: Normal breath sounds.   Abdominal:      General: Abdomen is flat. Bowel sounds are normal.      Palpations: Abdomen is soft.   Musculoskeletal:         General: Normal range of motion.   Neurological:      General: No focal deficit present.      Mental Status: She is alert.   Psychiatric:         Mood and Affect: Mood normal. Affect is tearful.         Behavior: Behavior normal.         Thought Content: Thought content does not include homicidal or suicidal ideation.         Assessment/Plan   She is here for follow-up visit.  Appears sees having slight worsening of polyarthralgia mostly bilateral knees and lower back.  She received Kenalog 40 mg in office to alleviate immediate inflammation and pain.  She will continue Mobic 15 mg daily as needed along with Tylenol as needed.  She will be seeing new rheum early next mo (02/6/25). Also encouraged to take gabapentin more regularly for help with the back and chronic pain.  OARRS reviewed and appears appropriate. Also made ortho/Dr Palma aware of her worsening sx thru secure chat.   Highly encouraged to see psych soon due to sl worsening of depression; cont following with therapist as usual. Seek ER care if sx worsens or new concerning sx as SI/HI and panic attacks. She is otherwise clinically & vitally stable.     Other chronic problems are stable; continue all medications as usual.       Problem List Items Addressed This Visit             " ICD-10-CM    Chronic low back pain M54.50, G89.29    Relevant Medications    triamcinolone acetonide (Kenalog-40) injection 40 mg (Completed)    Depression F32.A    Schizophrenia F20.9     Overall stable, cont following with psych and therapist as usual.          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    Relevant Medications    valACYclovir (Valtrex) 500 mg tablet     Other Visit Diagnoses         Codes    Chronic midline back pain, unspecified back location    -  Primary M54.9, G89.29    Relevant Medications    gabapentin (Neurontin) 300 mg capsule    Polyarthralgia     M25.50    Relevant Medications    meloxicam (Mobic) 15 mg tablet    Pain in both knees, unspecified chronicity     M25.561, M25.562    Relevant Medications    triamcinolone acetonide (Kenalog-40) injection 40 mg (Completed)          Rtc 4 mo for FU or sooner as needed     This note was partially generated using the Dragon Voice recognition system. There may be some incorrect wording, spelling and/or spelling errors or punctuation errors that were not corrected prior to committing the note to the medical record.      Tariq Parker MD   [facility]

## 2025-02-06 ENCOUNTER — APPOINTMENT (OUTPATIENT)
Dept: RADIOLOGY | Facility: HOSPITAL | Age: 25
End: 2025-02-06
Payer: MEDICAID

## 2025-02-06 ENCOUNTER — HOSPITAL ENCOUNTER (EMERGENCY)
Facility: HOSPITAL | Age: 25
Discharge: OTHER INSTITUTION | End: 2025-02-06
Attending: STUDENT IN AN ORGANIZED HEALTH CARE EDUCATION/TRAINING PROGRAM
Payer: MEDICAID

## 2025-02-06 ENCOUNTER — OFFICE VISIT (OUTPATIENT)
Dept: PRIMARY CARE | Facility: CLINIC | Age: 25
End: 2025-02-06
Payer: MEDICAID

## 2025-02-06 VITALS
SYSTOLIC BLOOD PRESSURE: 112 MMHG | HEIGHT: 61 IN | BODY MASS INDEX: 34.93 KG/M2 | RESPIRATION RATE: 18 BRPM | WEIGHT: 185 LBS | DIASTOLIC BLOOD PRESSURE: 77 MMHG | TEMPERATURE: 97.7 F | OXYGEN SATURATION: 100 % | HEART RATE: 75 BPM

## 2025-02-06 VITALS
HEIGHT: 61 IN | TEMPERATURE: 97.5 F | SYSTOLIC BLOOD PRESSURE: 118 MMHG | RESPIRATION RATE: 20 BRPM | DIASTOLIC BLOOD PRESSURE: 74 MMHG | BODY MASS INDEX: 34.93 KG/M2 | OXYGEN SATURATION: 99 % | WEIGHT: 185 LBS | HEART RATE: 70 BPM

## 2025-02-06 DIAGNOSIS — R42 DIZZINESS, NONSPECIFIC: Primary | ICD-10-CM

## 2025-02-06 DIAGNOSIS — R41.89 BRAIN FOG: ICD-10-CM

## 2025-02-06 DIAGNOSIS — R51.9 NONINTRACTABLE HEADACHE, UNSPECIFIED CHRONICITY PATTERN, UNSPECIFIED HEADACHE TYPE: Primary | ICD-10-CM

## 2025-02-06 DIAGNOSIS — H57.11 PERIORBITAL PAIN, RIGHT: ICD-10-CM

## 2025-02-06 DIAGNOSIS — M06.9 RHEUMATOID ARTHRITIS, INVOLVING UNSPECIFIED SITE, UNSPECIFIED WHETHER RHEUMATOID FACTOR PRESENT (MULTI): ICD-10-CM

## 2025-02-06 PROBLEM — M19.90 INFLAMMATORY ARTHRITIS: Status: ACTIVE | Noted: 2025-02-06

## 2025-02-06 PROBLEM — R45.851 FEELING SUICIDAL: Status: RESOLVED | Noted: 2025-02-06 | Resolved: 2025-02-06

## 2025-02-06 PROBLEM — O33.9: Status: ACTIVE | Noted: 2022-09-23

## 2025-02-06 PROBLEM — T81.49XA POSTOPERATIVE WOUND INFECTION: Status: RESOLVED | Noted: 2022-09-27 | Resolved: 2025-02-06

## 2025-02-06 PROBLEM — R45.851 SUICIDAL IDEATIONS: Status: RESOLVED | Noted: 2020-11-12 | Resolved: 2025-02-06

## 2025-02-06 PROBLEM — K52.9 CHRONIC DIARRHEA: Status: ACTIVE | Noted: 2025-02-06

## 2025-02-06 PROBLEM — D62 ACUTE POSTHEMORRHAGIC ANEMIA: Status: RESOLVED | Noted: 2022-09-23 | Resolved: 2025-02-06

## 2025-02-06 PROBLEM — F19.10 POLYSUBSTANCE ABUSE (MULTI): Status: ACTIVE | Noted: 2025-02-06

## 2025-02-06 PROBLEM — Z20.822 CONTACT WITH AND (SUSPECTED) EXPOSURE TO COVID-19: Status: RESOLVED | Noted: 2022-09-23 | Resolved: 2025-02-06

## 2025-02-06 PROBLEM — T50.905A ADVERSE REACTION TO DRUG: Status: RESOLVED | Noted: 2025-02-06 | Resolved: 2025-02-06

## 2025-02-06 PROBLEM — R68.89 INTOLERANT OF HEAT: Status: RESOLVED | Noted: 2025-02-06 | Resolved: 2025-02-06

## 2025-02-06 PROBLEM — R32 URINARY INCONTINENCE: Status: RESOLVED | Noted: 2025-02-06 | Resolved: 2025-02-06

## 2025-02-06 LAB
ANION GAP SERPL CALC-SCNC: 10 MMOL/L (ref 10–20)
BASOPHILS # BLD AUTO: 0.03 X10*3/UL (ref 0–0.1)
BASOPHILS NFR BLD AUTO: 0.4 %
BUN SERPL-MCNC: 12 MG/DL (ref 6–23)
CALCIUM SERPL-MCNC: 9.2 MG/DL (ref 8.6–10.3)
CHLORIDE SERPL-SCNC: 103 MMOL/L (ref 98–107)
CO2 SERPL-SCNC: 26 MMOL/L (ref 21–32)
CREAT SERPL-MCNC: 0.65 MG/DL (ref 0.5–1.05)
EGFRCR SERPLBLD CKD-EPI 2021: >90 ML/MIN/1.73M*2
EOSINOPHIL # BLD AUTO: 0.08 X10*3/UL (ref 0–0.7)
EOSINOPHIL NFR BLD AUTO: 1 %
ERYTHROCYTE [DISTWIDTH] IN BLOOD BY AUTOMATED COUNT: 13.8 % (ref 11.5–14.5)
GLUCOSE SERPL-MCNC: 93 MG/DL (ref 74–99)
HCT VFR BLD AUTO: 36.5 % (ref 36–46)
HGB BLD-MCNC: 11.8 G/DL (ref 12–16)
IMM GRANULOCYTES # BLD AUTO: 0.02 X10*3/UL (ref 0–0.7)
IMM GRANULOCYTES NFR BLD AUTO: 0.3 % (ref 0–0.9)
LYMPHOCYTES # BLD AUTO: 1.55 X10*3/UL (ref 1.2–4.8)
LYMPHOCYTES NFR BLD AUTO: 19.7 %
MCH RBC QN AUTO: 25.1 PG (ref 26–34)
MCHC RBC AUTO-ENTMCNC: 32.3 G/DL (ref 32–36)
MCV RBC AUTO: 78 FL (ref 80–100)
MONOCYTES # BLD AUTO: 0.58 X10*3/UL (ref 0.1–1)
MONOCYTES NFR BLD AUTO: 7.4 %
NEUTROPHILS # BLD AUTO: 5.62 X10*3/UL (ref 1.2–7.7)
NEUTROPHILS NFR BLD AUTO: 71.2 %
NRBC BLD-RTO: 0 /100 WBCS (ref 0–0)
PLATELET # BLD AUTO: 327 X10*3/UL (ref 150–450)
POTASSIUM SERPL-SCNC: 4.1 MMOL/L (ref 3.5–5.3)
RBC # BLD AUTO: 4.7 X10*6/UL (ref 4–5.2)
SODIUM SERPL-SCNC: 135 MMOL/L (ref 136–145)
WBC # BLD AUTO: 7.9 X10*3/UL (ref 4.4–11.3)

## 2025-02-06 PROCEDURE — 1036F TOBACCO NON-USER: CPT | Performed by: STUDENT IN AN ORGANIZED HEALTH CARE EDUCATION/TRAINING PROGRAM

## 2025-02-06 PROCEDURE — 3078F DIAST BP <80 MM HG: CPT | Performed by: STUDENT IN AN ORGANIZED HEALTH CARE EDUCATION/TRAINING PROGRAM

## 2025-02-06 PROCEDURE — 85025 COMPLETE CBC W/AUTO DIFF WBC: CPT | Performed by: STUDENT IN AN ORGANIZED HEALTH CARE EDUCATION/TRAINING PROGRAM

## 2025-02-06 PROCEDURE — 3074F SYST BP LT 130 MM HG: CPT | Performed by: STUDENT IN AN ORGANIZED HEALTH CARE EDUCATION/TRAINING PROGRAM

## 2025-02-06 PROCEDURE — 99215 OFFICE O/P EST HI 40 MIN: CPT | Performed by: STUDENT IN AN ORGANIZED HEALTH CARE EDUCATION/TRAINING PROGRAM

## 2025-02-06 PROCEDURE — 82374 ASSAY BLOOD CARBON DIOXIDE: CPT | Performed by: STUDENT IN AN ORGANIZED HEALTH CARE EDUCATION/TRAINING PROGRAM

## 2025-02-06 PROCEDURE — 2500000001 HC RX 250 WO HCPCS SELF ADMINISTERED DRUGS (ALT 637 FOR MEDICARE OP): Performed by: STUDENT IN AN ORGANIZED HEALTH CARE EDUCATION/TRAINING PROGRAM

## 2025-02-06 PROCEDURE — 99284 EMERGENCY DEPT VISIT MOD MDM: CPT | Mod: 25 | Performed by: STUDENT IN AN ORGANIZED HEALTH CARE EDUCATION/TRAINING PROGRAM

## 2025-02-06 PROCEDURE — 2500000004 HC RX 250 GENERAL PHARMACY W/ HCPCS (ALT 636 FOR OP/ED): Performed by: STUDENT IN AN ORGANIZED HEALTH CARE EDUCATION/TRAINING PROGRAM

## 2025-02-06 PROCEDURE — 70450 CT HEAD/BRAIN W/O DYE: CPT

## 2025-02-06 PROCEDURE — 36415 COLL VENOUS BLD VENIPUNCTURE: CPT | Performed by: STUDENT IN AN ORGANIZED HEALTH CARE EDUCATION/TRAINING PROGRAM

## 2025-02-06 PROCEDURE — 96374 THER/PROPH/DIAG INJ IV PUSH: CPT

## 2025-02-06 PROCEDURE — 3008F BODY MASS INDEX DOCD: CPT | Performed by: STUDENT IN AN ORGANIZED HEALTH CARE EDUCATION/TRAINING PROGRAM

## 2025-02-06 RX ORDER — ACETAMINOPHEN 325 MG/1
975 TABLET ORAL ONCE
Status: COMPLETED | OUTPATIENT
Start: 2025-02-06 | End: 2025-02-06

## 2025-02-06 RX ORDER — FOLIC ACID 1 MG/1
1 TABLET ORAL DAILY
COMMUNITY

## 2025-02-06 RX ORDER — METHOTREXATE 2.5 MG/1
2.5 TABLET ORAL
COMMUNITY
Start: 2025-02-06

## 2025-02-06 RX ORDER — MECLIZINE HCL 12.5 MG 12.5 MG/1
12.5 TABLET ORAL 3 TIMES DAILY PRN
Qty: 30 TABLET | Refills: 0 | Status: SHIPPED | OUTPATIENT
Start: 2025-02-06 | End: 2025-02-06 | Stop reason: ENTERED-IN-ERROR

## 2025-02-06 RX ORDER — PROCHLORPERAZINE EDISYLATE 5 MG/ML
10 INJECTION INTRAMUSCULAR; INTRAVENOUS ONCE
Status: COMPLETED | OUTPATIENT
Start: 2025-02-06 | End: 2025-02-06

## 2025-02-06 RX ORDER — TIZANIDINE 4 MG/1
1 TABLET ORAL DAILY PRN
COMMUNITY

## 2025-02-06 RX ADMIN — ACETAMINOPHEN 975 MG: 325 TABLET ORAL at 15:03

## 2025-02-06 RX ADMIN — PROCHLORPERAZINE EDISYLATE 10 MG: 5 INJECTION INTRAMUSCULAR; INTRAVENOUS at 15:02

## 2025-02-06 RX ADMIN — SODIUM CHLORIDE 500 ML: 9 INJECTION, SOLUTION INTRAVENOUS at 15:02

## 2025-02-06 SDOH — ECONOMIC STABILITY: FOOD INSECURITY: WITHIN THE PAST 12 MONTHS, THE FOOD YOU BOUGHT JUST DIDN'T LAST AND YOU DIDN'T HAVE MONEY TO GET MORE.: NEVER TRUE

## 2025-02-06 SDOH — ECONOMIC STABILITY: FOOD INSECURITY: WITHIN THE PAST 12 MONTHS, YOU WORRIED THAT YOUR FOOD WOULD RUN OUT BEFORE YOU GOT MONEY TO BUY MORE.: NEVER TRUE

## 2025-02-06 ASSESSMENT — ENCOUNTER SYMPTOMS
FEVER: 0
CHILLS: 0
DIZZINESS: 1
WEAKNESS: 1
PALPITATIONS: 0
ABDOMINAL PAIN: 0
VOMITING: 0
DIARRHEA: 0
WHEEZING: 0
SLEEP DISTURBANCE: 0
COLOR CHANGE: 0
MUSCULOSKELETAL NEGATIVE: 1
HEADACHES: 1
COUGH: 0
NERVOUS/ANXIOUS: 0
SHORTNESS OF BREATH: 0
CONFUSION: 1
CONSTIPATION: 0
FATIGUE: 1
TREMORS: 1
NAUSEA: 0
UNEXPECTED WEIGHT CHANGE: 0

## 2025-02-06 ASSESSMENT — LIFESTYLE VARIABLES
SKIP TO QUESTIONS 9-10: 1
HOW OFTEN DO YOU HAVE SIX OR MORE DRINKS ON ONE OCCASION: NEVER
AUDIT-C TOTAL SCORE: 0
HOW OFTEN DO YOU HAVE A DRINK CONTAINING ALCOHOL: NEVER
HOW MANY STANDARD DRINKS CONTAINING ALCOHOL DO YOU HAVE ON A TYPICAL DAY: PATIENT DOES NOT DRINK

## 2025-02-06 ASSESSMENT — PATIENT HEALTH QUESTIONNAIRE - PHQ9
1. LITTLE INTEREST OR PLEASURE IN DOING THINGS: NEARLY EVERY DAY
3. TROUBLE FALLING OR STAYING ASLEEP: NEARLY EVERY DAY
10. IF YOU CHECKED OFF ANY PROBLEMS, HOW DIFFICULT HAVE THESE PROBLEMS MADE IT FOR YOU TO DO YOUR WORK, TAKE CARE OF THINGS AT HOME, OR GET ALONG WITH OTHER PEOPLE: EXTREMELY DIFFICULT
4. FEELING TIRED OR HAVING LITTLE ENERGY: NEARLY EVERY DAY
7. TROUBLE CONCENTRATING ON THINGS, SUCH AS READING THE NEWSPAPER OR WATCHING TELEVISION: NEARLY EVERY DAY
2. FEELING DOWN, DEPRESSED OR HOPELESS: NEARLY EVERY DAY
SUM OF ALL RESPONSES TO PHQ QUESTIONS 1-9: 23
9. THOUGHTS THAT YOU WOULD BE BETTER OFF DEAD, OR OF HURTING YOURSELF: NOT AT ALL
5. POOR APPETITE OR OVEREATING: MORE THAN HALF THE DAYS
SUM OF ALL RESPONSES TO PHQ9 QUESTIONS 1 & 2: 6
8. MOVING OR SPEAKING SO SLOWLY THAT OTHER PEOPLE COULD HAVE NOTICED. OR THE OPPOSITE, BEING SO FIGETY OR RESTLESS THAT YOU HAVE BEEN MOVING AROUND A LOT MORE THAN USUAL: NEARLY EVERY DAY
6. FEELING BAD ABOUT YOURSELF - OR THAT YOU ARE A FAILURE OR HAVE LET YOURSELF OR YOUR FAMILY DOWN: NEARLY EVERY DAY

## 2025-02-06 ASSESSMENT — PAIN - FUNCTIONAL ASSESSMENT: PAIN_FUNCTIONAL_ASSESSMENT: 0-10

## 2025-02-06 ASSESSMENT — PAIN SCALES - GENERAL
PAINLEVEL_OUTOF10: 7
PAINLEVEL_OUTOF10: 8

## 2025-02-06 ASSESSMENT — COLUMBIA-SUICIDE SEVERITY RATING SCALE - C-SSRS
6. HAVE YOU EVER DONE ANYTHING, STARTED TO DO ANYTHING, OR PREPARED TO DO ANYTHING TO END YOUR LIFE?: NO
1. IN THE PAST MONTH, HAVE YOU WISHED YOU WERE DEAD OR WISHED YOU COULD GO TO SLEEP AND NOT WAKE UP?: NO
2. HAVE YOU ACTUALLY HAD ANY THOUGHTS OF KILLING YOURSELF?: NO

## 2025-02-06 NOTE — PROGRESS NOTES
"Subjective   Patient ID: Annamaria Mcgrath is a 24 y.o. female who presents for brain fog and Syncope (States that she does not feel right, and has going in and out of consciousness. \"Feels like the room is spinning\". Head hurts on top of head and R side of face. States that this came on suddenly last week.).    HPI   She is here for sick visit. Reports she went to see rheum this am and was adv to see us for possible brain scan. Reports she is feeling in & out of consciousness & feels room spinning x 1 wk; reports she wasn't able to control of her body, felt some tremor, some double & blurry vision & its coming in waves; reports she almost passed out last night, feels like she is overdose on meds/drugs but doesn't do anything. She feels heavy on her head, mostly on R side. She is also following with psych & currently taking Latuda, clomipramine and has been on same dose for quite some times. Later also reported remote h/o papilledema and had spinal tap, wasn't sure the exact date. She continued to have bl knee pain with some weakness on lower ext that she following with ortho and rheum.  She was rx'd methotrexate this am by rheum for possible RA.     Review of Systems   Constitutional:  Positive for fatigue. Negative for chills, fever and unexpected weight change.   HENT: Negative.     Respiratory:  Negative for cough, shortness of breath and wheezing.    Cardiovascular:  Negative for chest pain, palpitations and leg swelling.   Gastrointestinal:  Negative for abdominal pain, constipation, diarrhea, nausea and vomiting.   Musculoskeletal: Negative.    Skin:  Negative for color change and rash.   Neurological:  Positive for dizziness, tremors, weakness and headaches.   Psychiatric/Behavioral:  Positive for confusion. Negative for behavioral problems, self-injury, sleep disturbance and suicidal ideas. The patient is not nervous/anxious.        Objective   /74 (BP Location: Left arm, Patient Position: Sitting, BP Cuff " "Size: Large adult)   Pulse 70   Temp 36.4 °C (97.5 °F)   Resp 20   Ht 1.549 m (5' 1\")   Wt 83.9 kg (185 lb)   SpO2 99%   BMI 34.96 kg/m²     Physical Exam  Vitals and nursing note reviewed.   Constitutional:       Appearance: Normal appearance.      Comments: Fatigued and slightly drowsy appearing young female   HENT:      Right Ear: Tympanic membrane normal.      Left Ear: Tympanic membrane normal.   Eyes:      Extraocular Movements: Extraocular movements intact.      Pupils: Pupils are equal, round, and reactive to light.   Cardiovascular:      Rate and Rhythm: Normal rate and regular rhythm.      Pulses: Normal pulses.      Heart sounds: Normal heart sounds.   Pulmonary:      Effort: Pulmonary effort is normal.      Breath sounds: Normal breath sounds.   Abdominal:      General: Abdomen is flat. Bowel sounds are normal.      Palpations: Abdomen is soft.   Musculoskeletal:         General: Normal range of motion.   Neurological:      General: No focal deficit present.      Mental Status: She is alert and oriented to person, place, and time.      Cranial Nerves: No cranial nerve deficit.      Sensory: No sensory deficit.      Motor: No weakness.      Comments: Was taking a long time to get up from sitting position and ambulating very slow   Psychiatric:         Mood and Affect: Mood normal.         Behavior: Behavior normal.         Thought Content: Thought content does not include homicidal or suicidal ideation.         Assessment/Plan   She is here for sick visit.  Patient is having some brain fog, heaviness on the right side of face, fatigue and other as listed in HPI and symptoms are coming in waveform, unclear etiology of her presentation; possible neurological as papilledema in the setting of previous history VS metabolic VS meds induced.  Due to acute worsening of the symptoms last few days and also patient appeared very lethargic, we will send patient to ER for immediate evaluation and management as " indicated.  Patient wants to drive herself to ER; patient was advised to go via EMS however declined and states she will call 911 if symptom worsens further.  Patient case was discussed with another provider/colleague here in office who agree with the plan of sending to ER and also information was provided to the ER physician.  She is otherwise vitally stable.    Problem List Items Addressed This Visit             ICD-10-CM    Brain fog R41.89    Rheumatoid arthritis M06.9     Possible RA, cont following with rheum.           Other Visit Diagnoses         Codes    Dizziness, nonspecific    -  Primary R42    Periorbital pain, right     H57.11          RTC as scheduled    This note was partially generated using the Dragon Voice recognition system. There may be some incorrect wording, spelling and/or spelling errors or punctuation errors that were not corrected prior to committing the note to the medical record.      Tariq Parker MD    [facility]

## 2025-02-06 NOTE — ED PROVIDER NOTES
HPI   Chief Complaint   Patient presents with    near syncopal over past couple of days       This is a 24-year-old female who presents emergency department for heaviness of her right face that has been ongoing for about a week.  She states that intermittently she will be very tired.  She says she had an overwhelming sensation yesterday of weakness intermittently.  Currently she is having a headache with pounding on the right side of her head.  No other issues or concerns otherwise                          [place] Coma Scale Score: 15                  Patient History   Past Medical History:   Diagnosis Date    13 weeks gestation of pregnancy ([facility]) 11/01/2022    13 weeks gestation of pregnancy    18 weeks gestation of pregnancy ([facility]) 11/01/2022    18 weeks gestation of pregnancy    Abnormality in fetal heart rate and rhythm complicating labor and delivery 09/23/2022    Acute posthemorrhagic anemia 09/23/2022    Acute upper respiratory infection, unspecified 07/11/2022    URI with cough and congestion    Acute vaginitis 11/01/2022    Bacterial vaginitis    Amitriptyline overdose 05/10/2014    Contact with and (suspected) exposure to covid-19 11/01/2022    Exposure to COVID-19 virus    Contact with and (suspected) exposure to covid-19 09/23/2022    Contact with and (suspected) exposure to infections with a predominantly sexual mode of transmission 11/01/2022    Possible exposure to STD    Cyst of left ovary 07/18/2013    Diarrhea 10/06/2015    Encounter for gynecological examination (general) (routine) without abnormal findings 01/26/2022    Women's annual routine gynecological examination    Encounter for screening for infections with a predominantly sexual mode of transmission 11/01/2022    Screening for STD (sexually transmitted disease)    Encounter for supervision of normal pregnancy, unspecified, first trimester 11/01/2022    First trimester pregnancy    Encounter for supervision of normal pregnancy,  unspecified, second trimester 2022    Second trimester pregnancy    Encounter for supervision of normal pregnancy, unspecified, third trimester 08/15/2022    Third trimester pregnancy    Feeling suicidal 2025    Gestational (pregnancy-induced) hypertension without significant proteinuria, unspecified trimester ([facility]) 2022    Gestational hypertension, antepartum    Infection of obstetric surgical wound, unspecified ([facility]) 2022    Wound infection following  section, postpartum    Internal hernia 2014    Intolerant of heat 2025    Other conditions influencing health status 2022    History of cough    Other conditions influencing health status 2022    History of chronic diarrhea    Other injury of unspecified body region, initial encounter 2022    Wound discharge    Paratubal cyst 04/10/2014    Personal history of other diseases of the respiratory system 2022    History of sore throat    Personal history of other specified conditions 2022    History of nausea and vomiting    Postoperative wound infection 2022    Punctate keratitis 2015    Suicidal ideations 2020    Unspecified maternal hypertension, unspecified trimester ([facility]) 2022    Hypertension in pregnancy, antepartum    Urinary incontinence 2025     Past Surgical History:   Procedure Laterality Date    OTHER SURGICAL HISTORY  2021    Tumor excision    OTHER SURGICAL HISTORY  2021    [place] tooth extraction    OTHER SURGICAL HISTORY  2022    Tonsillectomy with adenoidectomy     Family History   Problem Relation Name Age of Onset    Other (cardiac disorder) Mother      Depression Mother      Mental illness Mother      Other (cardiac disorder) Sister      Other (cardiac disorder) Brother      Asthma Maternal Grandmother      Depression Maternal Grandmother      Other (Gallbladder cancer) Maternal Grandmother      Mental illness Maternal  Grandmother      Other (cardiac disorder) Maternal Grandmother      Other (cardiac disorder) Paternal Grandfather      Other (cardiac disorder) Sibling      Depression Sibling      Mental illness Sibling       Social History     Tobacco Use    Smoking status: Former     Types: Cigarettes     Passive exposure: Past    Smokeless tobacco: Never   Vaping Use    Vaping status: Never Used   Substance Use Topics    Alcohol use: Never    Drug use: Never       Physical Exam   ED Triage Vitals   Temperature Heart Rate Respirations BP   02/06/25 1344 02/06/25 1344 02/06/25 1344 02/06/25 1344   36.5 °C (97.7 °F) 67 18 134/85      Pulse Ox Temp src Heart Rate Source Patient Position   02/06/25 1344 -- 02/06/25 1435 02/06/25 1435   100 %  Monitor Lying      BP Location FiO2 (%)     02/06/25 1435 --     Right arm        Physical Exam  Constitutional:       General: She is not in acute distress.  HENT:      Head: Normocephalic and atraumatic.      Right Ear: Tympanic membrane normal.      Left Ear: Tympanic membrane normal.      Mouth/Throat:      Mouth: Mucous membranes are moist.   Eyes:      Extraocular Movements: Extraocular movements intact.      Conjunctiva/sclera: Conjunctivae normal.      Pupils: Pupils are equal, round, and reactive to light.   Cardiovascular:      Rate and Rhythm: Normal rate and regular rhythm.      Heart sounds: No murmur heard.  Pulmonary:      Effort: Pulmonary effort is normal. No respiratory distress.      Breath sounds: Normal breath sounds. No stridor. No wheezing or rales.   Abdominal:      General: Bowel sounds are normal. There is no distension.      Tenderness: There is no abdominal tenderness. There is no guarding or rebound.   Musculoskeletal:         General: No swelling, tenderness or deformity. Normal range of motion.   Skin:     General: Skin is warm and dry.      Coloration: Skin is not jaundiced.      Findings: No bruising or lesion.   Neurological:      General: No focal deficit  present.      Mental Status: She is alert and oriented to person, place, and time. Mental status is at baseline.      Cranial Nerves: No cranial nerve deficit.      Motor: No weakness.   Psychiatric:         Mood and Affect: Mood normal.       Labs Reviewed   CBC WITH AUTO DIFFERENTIAL - Abnormal       Result Value    WBC 7.9      nRBC 0.0      RBC 4.70      Hemoglobin 11.8 (*)     Hematocrit 36.5      MCV 78 (*)     MCH 25.1 (*)     MCHC 32.3      RDW 13.8      Platelets 327      Neutrophils % 71.2      Immature Granulocytes %, Automated 0.3      Lymphocytes % 19.7      Monocytes % 7.4      Eosinophils % 1.0      Basophils % 0.4      Neutrophils Absolute 5.62      Immature Granulocytes Absolute, Automated 0.02      Lymphocytes Absolute 1.55      Monocytes Absolute 0.58      Eosinophils Absolute 0.08      Basophils Absolute 0.03     BASIC METABOLIC PANEL - Abnormal    Glucose 93      Sodium 135 (*)     Potassium 4.1      Chloride 103      Bicarbonate 26      Anion Gap 10      Urea Nitrogen 12      Creatinine 0.65      eGFR >90      Calcium 9.2       CT head wo IV contrast   Final Result   No acute intracranial process.                  MACRO:   None.        Signed by: Andres Trent 2/6/2025 3:37 PM   Dictation workstation:   [id number]          ED Course & [facility]   ED Course as of 02/06/25 1624   Thu Feb 06, 2025   1500 EKG on my read shows sinus rhythm at a rate of 54 bpm no ST elevation seen, normal intervals. [CF]   1608 IMPRESSION:  No acute intracranial process.       [CF]   1623 Unfortunately patient left prior to treatment completion.  Her CT shows no signs of acute pathology. [CF]   1623   Her electrolytes are within normal limits and I do not believe that this is what is causing her symptoms.. [CF]      ED Course User Index  [CF] Felisa Washington MD       Medical Decision Making  24-year-old female presents emergency department for heaviness of her right face.  Along with intermittent fatigue and  tiredness.  She does have intermittent dizziness but is currently not dizzy.  EKG is nonischemic no signs of an arrhythmia.  Her electrolytes are within normal limits.  She is anemic but is better from her prior denies any hematuria or melena.  Fortunately patient left prior to completion of her workup but her CT shows no acute pathology.  She had no focal deficits Evalose patient for intracranial pathology at this time.        Procedure  Procedures     Felisa Washington MD  02/06/25 4740

## 2025-02-11 ENCOUNTER — HOSPITAL ENCOUNTER (OUTPATIENT)
Dept: RADIOLOGY | Facility: EXTERNAL LOCATION | Age: 25
Discharge: HOME | End: 2025-02-11

## 2025-02-11 ENCOUNTER — APPOINTMENT (OUTPATIENT)
Dept: ORTHOPEDIC SURGERY | Facility: CLINIC | Age: 25
End: 2025-02-11
Payer: MEDICAID

## 2025-02-11 VITALS — WEIGHT: 183.8 LBS | BODY MASS INDEX: 34.7 KG/M2 | HEIGHT: 61 IN

## 2025-02-11 DIAGNOSIS — M22.42 CHONDROMALACIA OF BOTH PATELLAE: Primary | ICD-10-CM

## 2025-02-11 DIAGNOSIS — M22.41 CHONDROMALACIA OF BOTH PATELLAE: Primary | ICD-10-CM

## 2025-02-11 PROCEDURE — 3008F BODY MASS INDEX DOCD: CPT | Performed by: EMERGENCY MEDICINE

## 2025-02-11 PROCEDURE — 20611 DRAIN/INJ JOINT/BURSA W/US: CPT | Performed by: EMERGENCY MEDICINE

## 2025-02-11 PROCEDURE — 99213 OFFICE O/P EST LOW 20 MIN: CPT | Performed by: EMERGENCY MEDICINE

## 2025-02-11 PROCEDURE — 1036F TOBACCO NON-USER: CPT | Performed by: EMERGENCY MEDICINE

## 2025-02-11 ASSESSMENT — PAIN SCALES - GENERAL: PAINLEVEL_OUTOF10: 8

## 2025-02-11 ASSESSMENT — ENCOUNTER SYMPTOMS
KNEE SWELLING: 1
KNEE DEFORMITY: 1

## 2025-02-11 ASSESSMENT — PAIN - FUNCTIONAL ASSESSMENT: PAIN_FUNCTIONAL_ASSESSMENT: 0-10

## 2025-02-11 NOTE — PROGRESS NOTES
Patient is here for bilateral knee pain, but has most her pain in the LEFT knee. Patient states that she fell about a month ago and that is when the pain started.     Subjective    Patient ID: Annamaria Mcgrath is a 24 y.o. female.    Chief Complaint: Pain of the Right Knee and Pain of the Left Knee (Here for bilateral Durolane injections/Lot: [id number], Exp: 2/28/27 - Lot: [id number], Exp: 5/31/27)     Last Surgery: No surgery found  Last Surgery Date: No surgery found    Annamaria is a 24-year-old female coming in with some acute bilateral knee pain that is worse on the right than the left.  The patient states that she fell down about 3 steps a month ago and landed on both of her knees.  Since that time she has had pretty severe pain that has limited her ability to walk.  She also reports that she is having some mechanical symptoms with grinding in both knees throughout range of motion and bending.  She has noticed that her right knee feels swollen.  Her left knee symptoms have been getting worse with activity and with time but she states that overall her right knee is more severe than the left.  She was seen by an outside facility/provider and was told that this could be rheumatologic.  She was started on meloxicam and referred to rheumatology.  She denies any similar prior injuries or episodes and has never had surgery on her knees. We decided for her to continue taking the meloxicam.  We are also going to pursue a stat MRI of the right knee to look for potential surgical pathology such as a meniscal tear given her symptoms, history, exam findings with an effusion and positive Yolande test.  I do think that she also has a component of bilateral chondromalacia patella.  She was therefore given a prescription for physical therapy and I will likely follow-up with her after the MRI.  If her MRI does not reveal any surgical pathology we could potentially also consider patellar stabilization braces and injections moving  forward.    Update on 11/11/2024. Annamaria is coming back in for a follow up visit regarding her bilateral knee pain that is worse on the right than the left.  She is also here today to follow-up after getting her right knee MRI. She is here today with her mother who is help provide additional history.  The patient states that her symptoms have been getting worse and that she is still having knee pain in both knees with limited range of motion.  We decided to perform an aspiration of the right knee with ultrasound guidance.  The patient tolerated the procedure well without any complications and activity modifications were reviewed.  We are going to send the aspirated fluid to the lab to screen for crystals or infection.  My suspicion is that this is instead an inflammatory arthritis.  I will follow-up with her on the phone and will refer or follow-up with her afterwards in the office as needed.  We could potentially perform injections if there is no infection.    Update on 11/18/2024.  Patient is coming back in for a follow-up visit regarding her bilateral knee pain that is worse on the right than the left and secondary to some patellofemoral chondromalacia and possibly inflammatory polyarthritis.  She is following up with rheumatology.  At her last appointment on 11/11/2024 we did an aspiration of the right knee and the fluid was sent to the lab.  White cells were 13,000.  Not consistent with septic arthritis.  No crystals were seen.  She is therefore coming back in today interested in bilateral knee cortisone injections. We agreed to perform bilateral knee cortisone injections under ultrasound guidance.  The patient tolerated both procedures well without any complications and activity modifications were reviewed.  She is going to follow-up with me in about 4 weeks to see how she is responding and whether or not we need to pursue viscosupplementation injections but she also knows to continue to follow-up with her  rheumatologist as I do think that she has some systemic inflammation or potentially an autoimmune disorder with her polyarthritis.    Update on 1/3/2025.  Patient is coming back in for a follow-up visit regarding her bilateral knee pain.  We did a cortisone injection in each knee on 11/18/2024 and she experienced extremely good pain relief but only for a couple of weeks and then the pain began to return.  Currently she is no longer experiencing any relief from the injections.  She did see rheumatology and had a frustrating appointment where reportedly she was told that most of her polyarticular symptoms are because of her weight.  She denies any trauma.  No other complaints today. We again discussed her treatment options and eventually agreed to order gel injections for her knees since she only responded to the steroid injections for about 2 weeks.  In addition, I suggested that she get a another opinion from a different rheumatologist since she is having polyarticular symptoms and gets rashes intermittently.  I really do think that she has an autoimmune process going on.  She is therefore going to follow-up with Maryse Rice at [facility].  She is then going to see me once her gel injections arrive.    Update on 2/11/2025.  Patient is coming back in for bilateral Durolane injections in her knees.  No change in her symptoms.  She was seen by a rheumatologist and recently started methotrexate.  She is having some ankle and elbow pain that she would like to follow-up with me about in the future if her pain does not get better.  No other complaints here today.    Right Knee     Left Knee         Objective   Right Knee Exam     Muscle Strength   The patient has normal right knee strength.    Tenderness   The patient is experiencing tenderness in the medial joint line and patella.    Range of Motion   Extension:  abnormal   Flexion:  abnormal     Tests   Yolande:  Medial - positive Lateral - negative  Varus:  negative Valgus: negative  Lachman:  Anterior - negative      Drawer:  Anterior - negative    Posterior - negative  Patellar apprehension: negative    Other   Erythema: absent  Sensation: normal  Swelling: mild  Effusion: effusion present    Comments:  Trace effusion      Left Knee Exam     Muscle Strength   The patient has normal left knee strength.    Tenderness   The patient is experiencing tenderness in the patella and medial joint line.    Range of Motion   Extension:  normal   Flexion:  abnormal     Tests   Yolande:  Medial - negative Lateral - negative  Varus: negative Valgus: negative  Lachman:  Anterior - negative      Drawer:  Anterior - negative     Posterior - negative  Patellar apprehension: negative    Other   Erythema: absent  Sensation: normal  Swelling: mild  Effusion: effusion present    Comments:  Trace effusion        Exam grossly unchanged    Image Results:  No new imaging today    Patient ID: Annamaria Mcgrath is a 24 y.o. female.    L Inj/Asp: bilateral knee on 2/11/2025 2:18 PM  Indications: pain  Details: 22 G needle, ultrasound-guided superolateral approach  Medications (Right): 60 mg sodium hyaluronate 60 mg/3 mL  Medications (Left): 60 mg sodium hyaluronate 60 mg/3 mL  Outcome: tolerated well, no immediate complications  Procedure, treatment alternatives, risks and benefits explained, specific risks discussed. Consent was given by the patient. Immediately prior to procedure a time out was called to verify the correct patient, procedure, equipment, support staff and site/side marked as required. Patient was prepped and draped in the usual sterile fashion.           Assessment/Plan   Encounter Diagnoses:  Chondromalacia of both patellae    Orders Placed This Encounter    L Inj/Asp    Point of Care Ultrasound     No follow-ups on file.    We discussed her treatment options and agreed to perform bilateral Durolane injections in her knees.  The patient tolerated the procedures well without any  complications and activity modifications were reviewed.  She is going to follow-up with me as needed moving forward especially with regards to the ankle and elbow if she needs a cortisone injection.  However she is also following with rheumatology based on one of my recommendations and was recently started on methotrexate which I am really hoping will provide her with significant relief for her polyarticular inflammatory arthritis.  We also discussed her potentially asking the rheumatologist about a potential diagnosis of Lyme or other infectious causes.    ** Please excuse any errors in grammar or translation related to this dictation. Voice recognition software was utilized to prepare this document. **       Melvin Palma MD  [facility]

## 2025-02-12 DIAGNOSIS — M22.41 CHONDROMALACIA OF BOTH PATELLAE: Primary | ICD-10-CM

## 2025-02-12 DIAGNOSIS — M22.42 CHONDROMALACIA OF BOTH PATELLAE: Primary | ICD-10-CM

## 2025-02-12 RX ORDER — PREDNISONE 50 MG/1
50 TABLET ORAL DAILY
Qty: 5 TABLET | Refills: 0 | Status: SHIPPED | OUTPATIENT
Start: 2025-02-12 | End: 2025-02-17

## 2025-02-12 NOTE — PROGRESS NOTES
Pain after injections in both knees  Told her to take tylenol 3x a day 1000mg each dose  Will also prescribe 50mg prednisone for 5d    ** Please excuse any errors in grammar or translation related to this dictation. Voice recognition software was utilized to prepare this document. **       Melvin Palma MD  [facility]

## 2025-03-05 ENCOUNTER — PATIENT MESSAGE (OUTPATIENT)
Dept: ORTHOPEDIC SURGERY | Facility: CLINIC | Age: 25
End: 2025-03-05
Payer: MEDICAID

## 2025-03-11 ENCOUNTER — APPOINTMENT (OUTPATIENT)
Dept: PRIMARY CARE | Facility: CLINIC | Age: 25
End: 2025-03-11
Payer: MEDICAID

## 2025-05-15 ENCOUNTER — APPOINTMENT (OUTPATIENT)
Dept: PRIMARY CARE | Facility: CLINIC | Age: 25
End: 2025-05-15
Payer: MEDICAID

## 2025-05-15 VITALS
DIASTOLIC BLOOD PRESSURE: 72 MMHG | OXYGEN SATURATION: 97 % | TEMPERATURE: 96.9 F | WEIGHT: 189.2 LBS | BODY MASS INDEX: 35.72 KG/M2 | SYSTOLIC BLOOD PRESSURE: 138 MMHG | RESPIRATION RATE: 20 BRPM | HEART RATE: 74 BPM | HEIGHT: 61 IN

## 2025-05-15 DIAGNOSIS — B00.1 RECURRENT COLD SORES: ICD-10-CM

## 2025-05-15 DIAGNOSIS — G89.29 CHRONIC BILATERAL LOW BACK PAIN, UNSPECIFIED WHETHER SCIATICA PRESENT: ICD-10-CM

## 2025-05-15 DIAGNOSIS — G89.29 CHRONIC MIDLINE BACK PAIN, UNSPECIFIED BACK LOCATION: Primary | ICD-10-CM

## 2025-05-15 DIAGNOSIS — M25.50 POLYARTHRALGIA: ICD-10-CM

## 2025-05-15 DIAGNOSIS — M54.50 CHRONIC BILATERAL LOW BACK PAIN, UNSPECIFIED WHETHER SCIATICA PRESENT: ICD-10-CM

## 2025-05-15 DIAGNOSIS — F41.1 GENERALIZED ANXIETY DISORDER: ICD-10-CM

## 2025-05-15 DIAGNOSIS — M54.9 CHRONIC MIDLINE BACK PAIN, UNSPECIFIED BACK LOCATION: Primary | ICD-10-CM

## 2025-05-15 PROCEDURE — 3078F DIAST BP <80 MM HG: CPT | Performed by: STUDENT IN AN ORGANIZED HEALTH CARE EDUCATION/TRAINING PROGRAM

## 2025-05-15 PROCEDURE — 99214 OFFICE O/P EST MOD 30 MIN: CPT | Performed by: STUDENT IN AN ORGANIZED HEALTH CARE EDUCATION/TRAINING PROGRAM

## 2025-05-15 PROCEDURE — 3075F SYST BP GE 130 - 139MM HG: CPT | Performed by: STUDENT IN AN ORGANIZED HEALTH CARE EDUCATION/TRAINING PROGRAM

## 2025-05-15 PROCEDURE — 3008F BODY MASS INDEX DOCD: CPT | Performed by: STUDENT IN AN ORGANIZED HEALTH CARE EDUCATION/TRAINING PROGRAM

## 2025-05-15 PROCEDURE — 1036F TOBACCO NON-USER: CPT | Performed by: STUDENT IN AN ORGANIZED HEALTH CARE EDUCATION/TRAINING PROGRAM

## 2025-05-15 RX ORDER — MELOXICAM 15 MG/1
15 TABLET ORAL DAILY PRN
Qty: 30 TABLET | Refills: 1 | Status: SHIPPED | OUTPATIENT
Start: 2025-05-15

## 2025-05-15 RX ORDER — GABAPENTIN 300 MG/1
300 CAPSULE ORAL 2 TIMES DAILY
Qty: 60 CAPSULE | Refills: 2 | Status: SHIPPED | OUTPATIENT
Start: 2025-05-15

## 2025-05-15 RX ORDER — DULOXETINE HYDROCHLORIDE 60 MG/1
60 CAPSULE, DELAYED RELEASE ORAL DAILY
COMMUNITY

## 2025-05-15 RX ORDER — VALACYCLOVIR HYDROCHLORIDE 500 MG/1
500 TABLET, FILM COATED ORAL DAILY
Qty: 30 TABLET | Refills: 1 | Status: SHIPPED | OUTPATIENT
Start: 2025-05-15

## 2025-05-15 SDOH — ECONOMIC STABILITY: FOOD INSECURITY: WITHIN THE PAST 12 MONTHS, THE FOOD YOU BOUGHT JUST DIDN'T LAST AND YOU DIDN'T HAVE MONEY TO GET MORE.: NEVER TRUE

## 2025-05-15 SDOH — ECONOMIC STABILITY: FOOD INSECURITY: WITHIN THE PAST 12 MONTHS, YOU WORRIED THAT YOUR FOOD WOULD RUN OUT BEFORE YOU GOT MONEY TO BUY MORE.: NEVER TRUE

## 2025-05-15 ASSESSMENT — PATIENT HEALTH QUESTIONNAIRE - PHQ9
5. POOR APPETITE OR OVEREATING: NEARLY EVERY DAY
2. FEELING DOWN, DEPRESSED OR HOPELESS: MORE THAN HALF THE DAYS
3. TROUBLE FALLING OR STAYING ASLEEP: NEARLY EVERY DAY
10. IF YOU CHECKED OFF ANY PROBLEMS, HOW DIFFICULT HAVE THESE PROBLEMS MADE IT FOR YOU TO DO YOUR WORK, TAKE CARE OF THINGS AT HOME, OR GET ALONG WITH OTHER PEOPLE: SOMEWHAT DIFFICULT
7. TROUBLE CONCENTRATING ON THINGS, SUCH AS READING THE NEWSPAPER OR WATCHING TELEVISION: NEARLY EVERY DAY
SUM OF ALL RESPONSES TO PHQ9 QUESTIONS 1 & 2: 4
4. FEELING TIRED OR HAVING LITTLE ENERGY: NEARLY EVERY DAY
8. MOVING OR SPEAKING SO SLOWLY THAT OTHER PEOPLE COULD HAVE NOTICED. OR THE OPPOSITE, BEING SO FIGETY OR RESTLESS THAT YOU HAVE BEEN MOVING AROUND A LOT MORE THAN USUAL: SEVERAL DAYS
1. LITTLE INTEREST OR PLEASURE IN DOING THINGS: MORE THAN HALF THE DAYS
6. FEELING BAD ABOUT YOURSELF - OR THAT YOU ARE A FAILURE OR HAVE LET YOURSELF OR YOUR FAMILY DOWN: NEARLY EVERY DAY

## 2025-05-15 ASSESSMENT — ENCOUNTER SYMPTOMS
SLEEP DISTURBANCE: 0
DIZZINESS: 0
MUSCULOSKELETAL NEGATIVE: 1
COLOR CHANGE: 0
VOMITING: 0
CHILLS: 0
CONSTIPATION: 0
UNEXPECTED WEIGHT CHANGE: 0
COUGH: 0
CONFUSION: 0
NERVOUS/ANXIOUS: 0
WHEEZING: 0
FATIGUE: 0
DEPRESSION: 0
LOSS OF SENSATION IN FEET: 0
NAUSEA: 0
SHORTNESS OF BREATH: 0
FEVER: 0
DIARRHEA: 0
ABDOMINAL PAIN: 0
OCCASIONAL FEELINGS OF UNSTEADINESS: 0
PALPITATIONS: 0
HEADACHES: 0

## 2025-05-15 ASSESSMENT — LIFESTYLE VARIABLES
HOW OFTEN DO YOU HAVE SIX OR MORE DRINKS ON ONE OCCASION: NEVER
HOW MANY STANDARD DRINKS CONTAINING ALCOHOL DO YOU HAVE ON A TYPICAL DAY: PATIENT DOES NOT DRINK
SKIP TO QUESTIONS 9-10: 1
AUDIT-C TOTAL SCORE: 0
HOW OFTEN DO YOU HAVE A DRINK CONTAINING ALCOHOL: NEVER

## 2025-05-15 ASSESSMENT — PAIN SCALES - GENERAL: PAINLEVEL_OUTOF10: 5

## 2025-05-15 NOTE — PROGRESS NOTES
"Subjective   Patient ID: Annamaria Mcgrath is a 25 y.o. female who presents for Follow-up (4 month follow up /No concerns/Pt was recently dx with ADHD by psychologist - is waiting to hear from Psychiatrist (June 6 , 2025 next appt) to start medication - dx 2 weeks  ago  ).    HPI   She is here for follow up. Reports she cont to have polyarthralgia, following with rheum and was told likely inflammatory arthritis vs RA; tried methotrexate but wasn't able to tolerate and now waiting on biologic. Currently she is taking mobic and also gabapentin 300 mg bid with some help. She is taking gabapentin for chronic back pain and also for anxiety. Reports she is also following with psychiatrist, recently put on Cymbalta 60 mg daily w/ minimal help. Also seeing Counsellor who recently dx'd w/ ADHD and will be discussing with psych at NOV.     Review of Systems   Constitutional:  Negative for chills, fatigue, fever and unexpected weight change.   HENT: Negative.     Respiratory:  Negative for cough, shortness of breath and wheezing.    Cardiovascular:  Negative for chest pain, palpitations and leg swelling.   Gastrointestinal:  Negative for abdominal pain, constipation, diarrhea, nausea and vomiting.   Musculoskeletal: Negative.    Skin:  Negative for color change and rash.   Neurological:  Negative for dizziness and headaches.   Psychiatric/Behavioral:  Negative for behavioral problems, confusion, self-injury, sleep disturbance and suicidal ideas. The patient is not nervous/anxious.        Objective   /72 (BP Location: Left arm, Patient Position: Sitting, BP Cuff Size: Adult)   Pulse 74   Temp 36.1 °C (96.9 °F) (Temporal)   Resp 20   Ht 1.549 m (5' 1\")   Wt 85.8 kg (189 lb 3.2 oz)   SpO2 97%   BMI 35.75 kg/m²     Physical Exam  Vitals and nursing note reviewed.   Constitutional:       Appearance: Normal appearance. She is obese.   Cardiovascular:      Rate and Rhythm: Normal rate and regular rhythm.      Pulses: Normal " pulses.      Heart sounds: Normal heart sounds.   Pulmonary:      Effort: Pulmonary effort is normal.      Breath sounds: Normal breath sounds.   Abdominal:      General: Abdomen is flat. Bowel sounds are normal.      Palpations: Abdomen is soft.   Musculoskeletal:         General: Normal range of motion.   Neurological:      General: No focal deficit present.      Mental Status: She is alert.   Psychiatric:         Mood and Affect: Mood normal.         Behavior: Behavior normal.       Assessment/Plan   She is here for follow-up visit.  Appears her back pain and polyarthralgia she has been seen, continue gabapentin 300 mg twice daily and Mobic daily as needed as usual.  Continue following with rheumatology for possible biologic as recommended.  She will continue following with a psychiatrist for anxiety, depression and other mental health issues as usual.  Continue to follow-up with psychologist for CBT.  Continue all psych meds as usual.  No SI/HI and or panic attacks reported.  Other chronic problems are stable, continue all meds as usual.  She is otherwise clinically and vitally stable.  Will plan on discussing other problems as weight at next visit.  Problem List Items Addressed This Visit           ICD-10-CM    Anxiety disorder F41.9    Relevant Medications    gabapentin (Neurontin) 300 mg capsule    Chronic low back pain M54.50, G89.29    Recurrent cold sores B00.1    Relevant Medications    valACYclovir (Valtrex) 500 mg tablet     Other Visit Diagnoses         Codes      Chronic midline back pain, unspecified back location    -  Primary M54.9, G89.29    Relevant Medications    gabapentin (Neurontin) 300 mg capsule      Polyarthralgia     M25.50    Relevant Medications    meloxicam (Mobic) 15 mg tablet          RTC 3 to 4 months for follow-up    This note was partially generated using the Dragon Voice recognition system. There may be some incorrect wording, spelling and/or spelling errors or punctuation errors  that were not corrected prior to committing the note to the medical record.      Tariq Parker MD    [facility]

## 2025-06-17 ENCOUNTER — TELEPHONE (OUTPATIENT)
Dept: PRIMARY CARE | Facility: CLINIC | Age: 25
End: 2025-06-17
Payer: MEDICAID

## 2025-06-17 DIAGNOSIS — M79.18 MYALGIA, MULTIPLE SITES: ICD-10-CM

## 2025-06-17 DIAGNOSIS — M54.9 CHRONIC MIDLINE BACK PAIN, UNSPECIFIED BACK LOCATION: ICD-10-CM

## 2025-06-17 DIAGNOSIS — M25.50 POLYARTHRALGIA: Primary | ICD-10-CM

## 2025-06-17 DIAGNOSIS — M26.621 ARTHRALGIA OF RIGHT TEMPOROMANDIBULAR JOINT: ICD-10-CM

## 2025-06-17 DIAGNOSIS — G89.29 CHRONIC MIDLINE BACK PAIN, UNSPECIFIED BACK LOCATION: ICD-10-CM

## 2025-06-17 NOTE — TELEPHONE ENCOUNTER
Patient would like a referral placed for pain management. Patient states that she see's rheumatology already but Dr. Parker brought up pain management in the past and she would like to do this because she is in so much pain. Please advise.

## 2025-06-24 ENCOUNTER — HOSPITAL ENCOUNTER (OUTPATIENT)
Dept: RADIOLOGY | Facility: EXTERNAL LOCATION | Age: 25
Discharge: HOME | End: 2025-06-24

## 2025-06-24 ENCOUNTER — APPOINTMENT (OUTPATIENT)
Dept: ORTHOPEDIC SURGERY | Facility: CLINIC | Age: 25
End: 2025-06-24
Payer: MEDICAID

## 2025-06-24 VITALS — HEIGHT: 61 IN | BODY MASS INDEX: 35.68 KG/M2 | WEIGHT: 189 LBS

## 2025-06-24 DIAGNOSIS — M22.42 CHONDROMALACIA OF BOTH PATELLAE: Primary | ICD-10-CM

## 2025-06-24 DIAGNOSIS — M22.41 CHONDROMALACIA OF BOTH PATELLAE: Primary | ICD-10-CM

## 2025-06-24 PROCEDURE — 20611 DRAIN/INJ JOINT/BURSA W/US: CPT | Performed by: EMERGENCY MEDICINE

## 2025-06-24 PROCEDURE — 99214 OFFICE O/P EST MOD 30 MIN: CPT | Performed by: EMERGENCY MEDICINE

## 2025-06-24 PROCEDURE — 3008F BODY MASS INDEX DOCD: CPT | Performed by: EMERGENCY MEDICINE

## 2025-06-24 RX ORDER — LIDOCAINE HYDROCHLORIDE 10 MG/ML
4 INJECTION, SOLUTION INFILTRATION; PERINEURAL
Status: COMPLETED | OUTPATIENT
Start: 2025-06-24 | End: 2025-06-24

## 2025-06-24 RX ORDER — METHYLPREDNISOLONE ACETATE 40 MG/ML
80 INJECTION, SUSPENSION INTRA-ARTICULAR; INTRALESIONAL; INTRAMUSCULAR; SOFT TISSUE
Status: COMPLETED | OUTPATIENT
Start: 2025-06-24 | End: 2025-06-24

## 2025-06-24 RX ADMIN — LIDOCAINE HYDROCHLORIDE 4 ML: 10 INJECTION, SOLUTION INFILTRATION; PERINEURAL at 15:34

## 2025-06-24 RX ADMIN — METHYLPREDNISOLONE ACETATE 80 MG: 40 INJECTION, SUSPENSION INTRA-ARTICULAR; INTRALESIONAL; INTRAMUSCULAR; SOFT TISSUE at 15:34

## 2025-06-24 ASSESSMENT — ENCOUNTER SYMPTOMS
KNEE SWELLING: 1
KNEE DEFORMITY: 1

## 2025-06-24 ASSESSMENT — PAIN SCALES - GENERAL: PAINLEVEL_OUTOF10: 6

## 2025-06-24 ASSESSMENT — PAIN - FUNCTIONAL ASSESSMENT: PAIN_FUNCTIONAL_ASSESSMENT: 0-10

## 2025-06-24 NOTE — PROGRESS NOTES
Patient is here for bilateral knee pain, but has most her pain in the LEFT knee. Patient states that she fell about a month ago and that is when the pain started.     Subjective    Patient ID: Annamaria Mcgrath is a 25 y.o. female.    Chief Complaint: Pain of the Left Knee (Patient presents today with RUTH knee. Patient had GEL injections on 2/11/2025 with no Improvement. Patient had a CSI in September with some improvement. Patient has taken oral steroids with improvement. Patient would like to receive CSI today. ) and Pain of the Right Knee     Last Surgery: No surgery found  Last Surgery Date: No surgery found    Annamaria is a 24-year-old female coming in with some acute bilateral knee pain that is worse on the right than the left.  The patient states that she fell down about 3 steps a month ago and landed on both of her knees.  Since that time she has had pretty severe pain that has limited her ability to walk.  She also reports that she is having some mechanical symptoms with grinding in both knees throughout range of motion and bending.  She has noticed that her right knee feels swollen.  Her left knee symptoms have been getting worse with activity and with time but she states that overall her right knee is more severe than the left.  She was seen by an outside facility/provider and was told that this could be rheumatologic.  She was started on meloxicam and referred to rheumatology.  She denies any similar prior injuries or episodes and has never had surgery on her knees. We decided for her to continue taking the meloxicam.  We are also going to pursue a stat MRI of the right knee to look for potential surgical pathology such as a meniscal tear given her symptoms, history, exam findings with an effusion and positive Yolande test.  I do think that she also has a component of bilateral chondromalacia patella.  She was therefore given a prescription for physical therapy and I will likely follow-up with her after the  MRI.  If her MRI does not reveal any surgical pathology we could potentially also consider patellar stabilization braces and injections moving forward.    Update on 11/11/2024. Annamaria is coming back in for a follow up visit regarding her bilateral knee pain that is worse on the right than the left.  She is also here today to follow-up after getting her right knee MRI. She is here today with her mother who is help provide additional history.  The patient states that her symptoms have been getting worse and that she is still having knee pain in both knees with limited range of motion.  We decided to perform an aspiration of the right knee with ultrasound guidance.  The patient tolerated the procedure well without any complications and activity modifications were reviewed.  We are going to send the aspirated fluid to the lab to screen for crystals or infection.  My suspicion is that this is instead an inflammatory arthritis.  I will follow-up with her on the phone and will refer or follow-up with her afterwards in the office as needed.  We could potentially perform injections if there is no infection.    Update on 11/18/2024.  Patient is coming back in for a follow-up visit regarding her bilateral knee pain that is worse on the right than the left and secondary to some patellofemoral chondromalacia and possibly inflammatory polyarthritis.  She is following up with rheumatology.  At her last appointment on 11/11/2024 we did an aspiration of the right knee and the fluid was sent to the lab.  White cells were 13,000.  Not consistent with septic arthritis.  No crystals were seen.  She is therefore coming back in today interested in bilateral knee cortisone injections. We agreed to perform bilateral knee cortisone injections under ultrasound guidance.  The patient tolerated both procedures well without any complications and activity modifications were reviewed.  She is going to follow-up with me in about 4 weeks to see how  she is responding and whether or not we need to pursue viscosupplementation injections but she also knows to continue to follow-up with her rheumatologist as I do think that she has some systemic inflammation or potentially an autoimmune disorder with her polyarthritis.    Update on 1/3/2025.  Patient is coming back in for a follow-up visit regarding her bilateral knee pain.  We did a cortisone injection in each knee on 11/18/2024 and she experienced extremely good pain relief but only for a couple of weeks and then the pain began to return.  Currently she is no longer experiencing any relief from the injections.  She did see rheumatology and had a frustrating appointment where reportedly she was told that most of her polyarticular symptoms are because of her weight.  She denies any trauma.  No other complaints today. We again discussed her treatment options and eventually agreed to order gel injections for her knees since she only responded to the steroid injections for about 2 weeks.  In addition, I suggested that she get a another opinion from a different rheumatologist since she is having polyarticular symptoms and gets rashes intermittently.  I really do think that she has an autoimmune process going on.  She is therefore going to follow-up with Maryse Rice at [facility].  She is then going to see me once her gel injections arrive.    Update on 2/11/2025.  Patient is coming back in for bilateral Durolane injections in her knees.  No change in her symptoms.  She was seen by a rheumatologist and recently started methotrexate.  She is having some ankle and elbow pain that she would like to follow-up with me about in the future if her pain does not get better.  No other complaints here today. We discussed her treatment options and agreed to perform bilateral Durolane injections in her knees.  The patient tolerated the procedures well without any complications and activity modifications were reviewed.  She is  going to follow-up with me as needed moving forward especially with regards to the ankle and elbow if she needs a cortisone injection.  However she is also following with rheumatology based on one of my recommendations and was recently started on methotrexate which I am really hoping will provide her with significant relief for her polyarticular inflammatory arthritis.  We also discussed her potentially asking the rheumatologist about a potential diagnosis of Lyme or other infectious causes.    Update on 6/24/2025.  Patient is coming back in for her acute on chronic bilateral knee pain secondary to inflammatory polyarthritis.  She is following with rheumatology.  She did not do well on the methotrexate so currently she is just taking meloxicam and Cymbalta.  They are trying to get Biologics approved.  She is here today requesting repeat cortisone injections just for some temporary relief.  No trauma.  No infectious symptoms.  No other complaints or today aside from her elbows which are also giving her some chronic discomfort.    Right Knee     Left Knee         Objective   Right Knee Exam     Muscle Strength   The patient has normal right knee strength.    Tenderness   The patient is experiencing tenderness in the medial joint line and patella.    Range of Motion   Extension:  abnormal   Flexion:  abnormal     Tests   Yolande:  Medial - positive Lateral - negative  Varus: negative Valgus: negative  Lachman:  Anterior - negative      Drawer:  Anterior - negative    Posterior - negative  Patellar apprehension: negative    Other   Erythema: absent  Sensation: normal  Swelling: mild  Effusion: effusion present    Comments:  Trace effusion      Left Knee Exam     Muscle Strength   The patient has normal left knee strength.    Tenderness   The patient is experiencing tenderness in the patella and medial joint line.    Range of Motion   Extension:  normal   Flexion:  abnormal     Tests   Yolande:  Medial - negative  Lateral - negative  Varus: negative Valgus: negative  Lachman:  Anterior - negative      Drawer:  Anterior - negative     Posterior - negative  Patellar apprehension: negative    Other   Erythema: absent  Sensation: normal  Swelling: mild  Effusion: effusion present    Comments:  Trace effusion        Exam grossly unchanged    Image Results:  No new imaging today    Patient ID: Annamaria Mcgrath is a 25 y.o. female.    L Inj/Asp: bilateral knee on 6/24/2025 3:34 PM  Indications: pain and joint swelling  Details: 22 G needle, ultrasound-guided superolateral approach  Medications (Right): 80 mg methylPREDNISolone acetate 40 mg/mL; 4 mL lidocaine 10 mg/mL (1 %)  Medications (Left): 4 mL lidocaine 10 mg/mL (1 %); 80 mg methylPREDNISolone acetate 40 mg/mL  Outcome: tolerated well, no immediate complications  Procedure, treatment alternatives, risks and benefits explained, specific risks discussed. Consent was given by the patient. Immediately prior to procedure a time out was called to verify the correct patient, procedure, equipment, support staff and site/side marked as required. Patient was prepped and draped in the usual sterile fashion.           Assessment/Plan   Encounter Diagnoses:  Chondromalacia of both patellae    Orders Placed This Encounter    L Inj/Asp    Point of Care Ultrasound     No follow-ups on file.    We discussed the patient's treatment options at length and eventually decided to perform a bilateral knee joint cortisone injection under ultrasound guidance here today in the office.  The patient tolerated the procedure well without any complications and activity modifications were reviewed. They will begin using prescription dose Tylenol at 1000 mg three times daily and  The patient will follow-up with me as needed depending on how they respond to this injection.  We also discussed PRP but for now it is too expensive.    ** Please excuse any errors in grammar or translation related to this dictation.  Voice recognition software was utilized to prepare this document. **       Melvin Palma MD  [facility]

## 2025-06-25 ENCOUNTER — HOSPITAL ENCOUNTER (OUTPATIENT)
Dept: RADIOLOGY | Facility: EXTERNAL LOCATION | Age: 25
Discharge: HOME | End: 2025-06-25

## 2025-07-07 ENCOUNTER — TELEPHONE (OUTPATIENT)
Dept: ORTHOPEDIC SURGERY | Facility: CLINIC | Age: 25
End: 2025-07-07
Payer: MEDICAID

## 2025-07-07 ENCOUNTER — TELEPHONE (OUTPATIENT)
Dept: PRIMARY CARE | Facility: CLINIC | Age: 25
End: 2025-07-07
Payer: MEDICAID

## 2025-07-07 NOTE — TELEPHONE ENCOUNTER
Patient is getting assistance at this time and she needs a letter stating that she is unable to work due to her arthritis. Are we able to write that note?    Please advise

## 2025-07-07 NOTE — TELEPHONE ENCOUNTER
Patient called in requesting for a letter that states she Is unable to work due inflammatory arthritis and not able to  get medication yet. This is so the patient can continue to receive food stamps.

## 2025-07-08 NOTE — TELEPHONE ENCOUNTER
Patient returned call and message was relayed.  She expressed understanding and had no further questions.

## 2025-07-08 NOTE — TELEPHONE ENCOUNTER
I attempted to call patient to relay Dr. Parker's message, but the call went to voice mail.  If patient calls back, please relay the message.

## 2025-07-28 ENCOUNTER — APPOINTMENT (OUTPATIENT)
Dept: PAIN MEDICINE | Facility: HOSPITAL | Age: 25
End: 2025-07-28
Payer: MEDICAID

## 2025-07-31 ENCOUNTER — OFFICE VISIT (OUTPATIENT)
Dept: PAIN MEDICINE | Facility: HOSPITAL | Age: 25
End: 2025-07-31
Payer: MEDICAID

## 2025-07-31 VITALS
BODY MASS INDEX: 35.3 KG/M2 | HEART RATE: 70 BPM | RESPIRATION RATE: 16 BRPM | OXYGEN SATURATION: 98 % | DIASTOLIC BLOOD PRESSURE: 80 MMHG | SYSTOLIC BLOOD PRESSURE: 132 MMHG | HEIGHT: 61 IN | WEIGHT: 187 LBS

## 2025-07-31 DIAGNOSIS — M79.7 FIBROMYALGIA: ICD-10-CM

## 2025-07-31 DIAGNOSIS — M06.9 RHEUMATOID ARTHRITIS INVOLVING MULTIPLE SITES, UNSPECIFIED WHETHER RHEUMATOID FACTOR PRESENT (MULTI): Primary | ICD-10-CM

## 2025-07-31 PROCEDURE — 99214 OFFICE O/P EST MOD 30 MIN: CPT | Performed by: PHYSICAL MEDICINE & REHABILITATION

## 2025-07-31 PROCEDURE — 3079F DIAST BP 80-89 MM HG: CPT | Performed by: PHYSICAL MEDICINE & REHABILITATION

## 2025-07-31 PROCEDURE — 3075F SYST BP GE 130 - 139MM HG: CPT | Performed by: PHYSICAL MEDICINE & REHABILITATION

## 2025-07-31 PROCEDURE — 99204 OFFICE O/P NEW MOD 45 MIN: CPT | Performed by: PHYSICAL MEDICINE & REHABILITATION

## 2025-07-31 PROCEDURE — 3008F BODY MASS INDEX DOCD: CPT | Performed by: PHYSICAL MEDICINE & REHABILITATION

## 2025-07-31 RX ORDER — GABAPENTIN 300 MG/1
CAPSULE ORAL
Qty: 180 CAPSULE | Refills: 2 | Status: SHIPPED | OUTPATIENT
Start: 2025-07-31

## 2025-07-31 ASSESSMENT — ENCOUNTER SYMPTOMS
LOSS OF SENSATION IN FEET: 0
OCCASIONAL FEELINGS OF UNSTEADINESS: 0
DEPRESSION: 0

## 2025-07-31 NOTE — PROGRESS NOTES
Subjective   Patient ID: Annamaria Mcgrath is a 25 y.o. female who presents for Rheumatoid Arthritis.  HPI    Patient presents to office for initial eval of generalized RA pains. Patient is in the process of having biologic medication approved by insurance. Patient states all major joints of the body are plagued by aching pains and that she frequently experiences non radiating back pain.    Pain Score: 8/10    Injections/Procedures: bilateral knee injections with ortho    Neuromodulators/Other Medications: duloxetine 60mg, meloxicam 15mg, gabapentin 300mg BID    Other: postioning, rest    As above, patient does also have a history of fibromyalgia with widespread pain but is really having more of the joint pains that have really worsened since her diagnosis about 6 months ago.  She did not tolerate methotrexate and is hopeful to get on a biologic soon by rheumatology.  She has not really been able to exercise but would be interested in some aquatic physical therapy.  No specific area of pain.  Has never been on a higher dose of gabapentin.                         Monitoring and compliance:    ORT:    PDUQ:    Office Agreement:      Review of Systems     Current Outpatient Medications   Medication Instructions    albuterol 90 mcg/actuation inhaler 2 puffs, Every 4 hours PRN    clomiPRAMINE (ANAFRANIL) 50 mg, Nightly    Cymbalta 60 mg, Daily    fluticasone (Flovent) 44 mcg/actuation inhaler 2 puffs, 2 times daily RT    gabapentin (NEURONTIN) 300 mg, oral, 2 times daily    lurasidone (LATUDA) 120 mg, Daily with evening meal    meloxicam (MOBIC) 15 mg, oral, Daily PRN    valACYclovir (VALTREX) 500 mg, oral, Daily        Medical History[1]     Surgical History[2]     Family History[3]     RX Allergies[4]     No results found for this or any previous visit from the past 1000 days.      Objective     Vitals:    07/31/25 1320   BP: 132/80   Pulse: 70   Resp: 16   SpO2: 98%               Physical Exam    GENERAL EXAM  Vital Signs:  Vital signs to include heart rate, respiration rate, blood pressure, and temperature were reviewed.  General Appearance:  Awake, alert, healthy appearing, well developed, No acute distress.  Head: Normocephalic without evidence of head injury.  Neck: The appearance of the neck was normal without swelling with a midline trachea.  Eyes: The eyelids and eyebrows exhibited no abnormalities.  Pupils were not pin-point.  Sclera was without icterus.  Lungs: Respiration rhythm and depth was normal.  Respiratory movements were normal without labored breathing.  Cardiovascular: No peripheral edema was present.    Neurological: Patient was oriented to time, place, and person.  Speech was normal.  Balance, gait, and stance were unremarkable.    Psychiatric: Appearance was normal with appropriate dress.  Mood was euthymic and affect was normal.  Skin: Affected regions were without ecchymosis or skin lesions.    Moves all 4 limbs without issues to gravity    Physical exam as above except:  Widespread tenderness to palpation to essentially all of her joints.          Assessment/Plan   Diagnoses and all orders for this visit:  Rheumatoid arthritis involving multiple sites, unspecified whether rheumatoid factor present (Multi)  -     Referral to Physical Therapy; Future  -     gabapentin (Neurontin) 300 mg capsule; Day 1 Take 300mg at bedtime, Day 2 300mg BID, Day 3 300mg TID, Day 4-6 300mg AM 300mg afternoon 600mg at bedtime, Day 7-9 300mg Am, 600mg afternoon, 600mg at bedtime, Day 10 and after 600mg TID.  Fibromyalgia  -     Referral to Physical Therapy; Future  -     gabapentin (Neurontin) 300 mg capsule; Day 1 Take 300mg at bedtime, Day 2 300mg BID, Day 3 300mg TID, Day 4-6 300mg AM 300mg afternoon 600mg at bedtime, Day 7-9 300mg Am, 600mg afternoon, 600mg at bedtime, Day 10 and after 600mg TID.  Other orders  -     Referral to Pain Medicine    25-year-old female with recent diagnosis of rheumatoid arthritis with widespread  polyarthralgia pain secondary to that as well as fibromyalgia.  Ultimately getting treated appropriately with biologic or other medications for rheumatoid arthritis is probably her best treatment long-term but given that she does have an concomitant fibromyalgia I think we can optimize her medication regimen little bit more as well as get her into some aquatic physical therapy.  Plan:  - Continue with meloxicam and duloxetine.  - I am going to titrate up her gabapentin to 600 mg 3 times daily as her GFR is greater than 90.  - Referral for aquatic physical therapy.  - She is going to follow-up with my nurse practitioner in 2 months.  We may consider low-dose naltrexone in the future as this may be a great option for all of her pain as well.         This note was generated with the aid of dictation software, there may be typos despite my attempts at proofreading.          [1]   Past Medical History:  Diagnosis Date    13 weeks gestation of pregnancy ([facility]) 11/01/2022    13 weeks gestation of pregnancy    18 weeks gestation of pregnancy ([facility]) 11/01/2022    18 weeks gestation of pregnancy    Abnormality in fetal heart rate and rhythm complicating labor and delivery 09/23/2022    Acute posthemorrhagic anemia 09/23/2022    Acute upper respiratory infection, unspecified 07/11/2022    URI with cough and congestion    Acute vaginitis 11/01/2022    Bacterial vaginitis    ADHD (attention deficit hyperactivity disorder)     Amitriptyline overdose 05/10/2014    Contact with and (suspected) exposure to covid-19 11/01/2022    Exposure to COVID-19 virus    Contact with and (suspected) exposure to covid-19 09/23/2022    Contact with and (suspected) exposure to infections with a predominantly sexual mode of transmission 11/01/2022    Possible exposure to STD    Cyst of left ovary 07/18/2013    Diarrhea 10/06/2015    Encounter for gynecological examination (general) (routine) without abnormal findings 01/26/2022    Women's  annual routine gynecological examination    Encounter for screening for infections with a predominantly sexual mode of transmission 2022    Screening for STD (sexually transmitted disease)    Encounter for supervision of normal pregnancy, unspecified, first trimester 2022    First trimester pregnancy    Encounter for supervision of normal pregnancy, unspecified, second trimester 2022    Second trimester pregnancy    Encounter for supervision of normal pregnancy, unspecified, third trimester 08/15/2022    Third trimester pregnancy    Feeling suicidal 2025    Gestational (pregnancy-induced) hypertension without significant proteinuria, unspecified trimester ([facility]) 2022    Gestational hypertension, antepartum    Infection of obstetric surgical wound, unspecified ([facility]) 2022    Wound infection following  section, postpartum    Internal hernia 2014    Intolerant of heat 2025    Other conditions influencing health status 2022    History of cough    Other conditions influencing health status 2022    History of chronic diarrhea    Other injury of unspecified body region, initial encounter 2022    Wound discharge    Paratubal cyst 04/10/2014    Personal history of other diseases of the respiratory system 2022    History of sore throat    Personal history of other specified conditions 2022    History of nausea and vomiting    Postoperative wound infection 2022    Punctate keratitis 2015    Suicidal ideations 2020    Unspecified maternal hypertension, unspecified trimester ([facility]) 2022    Hypertension in pregnancy, antepartum    Urinary incontinence 2025   [2]   Past Surgical History:  Procedure Laterality Date    OTHER SURGICAL HISTORY  2021    Tumor excision    OTHER SURGICAL HISTORY  2021    [place] tooth extraction    OTHER SURGICAL HISTORY  2022    Tonsillectomy with adenoidectomy    [3]   Family History  Problem Relation Name Age of Onset    Other (cardiac disorder) Mother      Depression Mother      Mental illness Mother      Other (cardiac disorder) Sister      Other (cardiac disorder) Brother      Asthma Maternal Grandmother      Depression Maternal Grandmother      Other (Gallbladder cancer) Maternal Grandmother      Mental illness Maternal Grandmother      Other (cardiac disorder) Maternal Grandmother      Other (cardiac disorder) Paternal Grandfather      Other (cardiac disorder) Sibling      Depression Sibling      Mental illness Sibling     [4]   Allergies  Allergen Reactions    Mushroom Unknown    Ziprasidone Hcl Unknown

## 2025-08-14 ENCOUNTER — APPOINTMENT (OUTPATIENT)
Dept: PAIN MEDICINE | Facility: HOSPITAL | Age: 25
End: 2025-08-14
Payer: MEDICAID

## 2025-08-18 ENCOUNTER — APPOINTMENT (OUTPATIENT)
Dept: PRIMARY CARE | Facility: CLINIC | Age: 25
End: 2025-08-18
Payer: MEDICAID

## 2025-08-18 VITALS
DIASTOLIC BLOOD PRESSURE: 68 MMHG | TEMPERATURE: 96.8 F | BODY MASS INDEX: 35.3 KG/M2 | HEIGHT: 61 IN | RESPIRATION RATE: 16 BRPM | WEIGHT: 187 LBS | HEART RATE: 87 BPM | OXYGEN SATURATION: 98 % | SYSTOLIC BLOOD PRESSURE: 102 MMHG

## 2025-08-18 DIAGNOSIS — M79.7 FIBROMYALGIA: ICD-10-CM

## 2025-08-18 DIAGNOSIS — M06.9 RHEUMATOID ARTHRITIS, INVOLVING UNSPECIFIED SITE, UNSPECIFIED WHETHER RHEUMATOID FACTOR PRESENT (MULTI): Primary | ICD-10-CM

## 2025-08-18 DIAGNOSIS — B00.1 RECURRENT COLD SORES: ICD-10-CM

## 2025-08-18 DIAGNOSIS — M25.50 POLYARTHRALGIA: ICD-10-CM

## 2025-08-18 DIAGNOSIS — Z71.3 WEIGHT LOSS COUNSELING, ENCOUNTER FOR: ICD-10-CM

## 2025-08-18 DIAGNOSIS — R63.5 WEIGHT GAIN, ABNORMAL: ICD-10-CM

## 2025-08-18 PROCEDURE — 3074F SYST BP LT 130 MM HG: CPT | Performed by: STUDENT IN AN ORGANIZED HEALTH CARE EDUCATION/TRAINING PROGRAM

## 2025-08-18 PROCEDURE — 3008F BODY MASS INDEX DOCD: CPT | Performed by: STUDENT IN AN ORGANIZED HEALTH CARE EDUCATION/TRAINING PROGRAM

## 2025-08-18 PROCEDURE — 3078F DIAST BP <80 MM HG: CPT | Performed by: STUDENT IN AN ORGANIZED HEALTH CARE EDUCATION/TRAINING PROGRAM

## 2025-08-18 PROCEDURE — 99214 OFFICE O/P EST MOD 30 MIN: CPT | Performed by: STUDENT IN AN ORGANIZED HEALTH CARE EDUCATION/TRAINING PROGRAM

## 2025-08-18 RX ORDER — VALACYCLOVIR HYDROCHLORIDE 500 MG/1
500 TABLET, FILM COATED ORAL DAILY
Qty: 30 TABLET | Refills: 1 | Status: SHIPPED | OUTPATIENT
Start: 2025-08-18

## 2025-08-18 RX ORDER — MELOXICAM 15 MG/1
15 TABLET ORAL DAILY PRN
Qty: 30 TABLET | Refills: 2 | Status: SHIPPED | OUTPATIENT
Start: 2025-08-18

## 2025-08-18 RX ORDER — SEMAGLUTIDE 0.25 MG/.5ML
0.25 INJECTION, SOLUTION SUBCUTANEOUS WEEKLY
Qty: 2 ML | Refills: 2 | Status: SHIPPED | OUTPATIENT
Start: 2025-08-18 | End: 2025-08-22 | Stop reason: WASHOUT

## 2025-08-18 SDOH — ECONOMIC STABILITY: FOOD INSECURITY: WITHIN THE PAST 12 MONTHS, THE FOOD YOU BOUGHT JUST DIDN'T LAST AND YOU DIDN'T HAVE MONEY TO GET MORE.: NEVER TRUE

## 2025-08-18 SDOH — ECONOMIC STABILITY: FOOD INSECURITY: WITHIN THE PAST 12 MONTHS, YOU WORRIED THAT YOUR FOOD WOULD RUN OUT BEFORE YOU GOT MONEY TO BUY MORE.: NEVER TRUE

## 2025-08-18 ASSESSMENT — ENCOUNTER SYMPTOMS
WHEEZING: 0
CONFUSION: 0
NAUSEA: 0
UNEXPECTED WEIGHT CHANGE: 0
VOMITING: 0
HEADACHES: 0
DIZZINESS: 0
PALPITATIONS: 0
DIARRHEA: 0
SHORTNESS OF BREATH: 0
CHILLS: 0
ARTHRALGIAS: 1
COUGH: 0
FATIGUE: 0
CONSTIPATION: 0
ABDOMINAL PAIN: 0
COLOR CHANGE: 0
FEVER: 0

## 2025-08-18 ASSESSMENT — PATIENT HEALTH QUESTIONNAIRE - PHQ9
SUM OF ALL RESPONSES TO PHQ9 QUESTIONS 1 & 2: 6
1. LITTLE INTEREST OR PLEASURE IN DOING THINGS: NEARLY EVERY DAY
3. TROUBLE FALLING OR STAYING ASLEEP: NEARLY EVERY DAY
7. TROUBLE CONCENTRATING ON THINGS, SUCH AS READING THE NEWSPAPER OR WATCHING TELEVISION: NEARLY EVERY DAY
9. THOUGHTS THAT YOU WOULD BE BETTER OFF DEAD, OR OF HURTING YOURSELF: NOT AT ALL
2. FEELING DOWN, DEPRESSED OR HOPELESS: NEARLY EVERY DAY
6. FEELING BAD ABOUT YOURSELF - OR THAT YOU ARE A FAILURE OR HAVE LET YOURSELF OR YOUR FAMILY DOWN: NEARLY EVERY DAY
SUM OF ALL RESPONSES TO PHQ QUESTIONS 1-9: 24
4. FEELING TIRED OR HAVING LITTLE ENERGY: NEARLY EVERY DAY
5. POOR APPETITE OR OVEREATING: NEARLY EVERY DAY
8. MOVING OR SPEAKING SO SLOWLY THAT OTHER PEOPLE COULD HAVE NOTICED. OR THE OPPOSITE, BEING SO FIGETY OR RESTLESS THAT YOU HAVE BEEN MOVING AROUND A LOT MORE THAN USUAL: NEARLY EVERY DAY

## 2025-08-18 ASSESSMENT — LIFESTYLE VARIABLES
HOW OFTEN DO YOU HAVE A DRINK CONTAINING ALCOHOL: NEVER
HOW OFTEN DO YOU HAVE SIX OR MORE DRINKS ON ONE OCCASION: NEVER
HOW MANY STANDARD DRINKS CONTAINING ALCOHOL DO YOU HAVE ON A TYPICAL DAY: PATIENT DOES NOT DRINK
SKIP TO QUESTIONS 9-10: 1
AUDIT-C TOTAL SCORE: 0

## 2025-08-19 ENCOUNTER — TELEPHONE (OUTPATIENT)
Dept: PRIMARY CARE | Facility: CLINIC | Age: 25
End: 2025-08-19
Payer: MEDICAID

## 2025-08-22 ENCOUNTER — TELEMEDICINE (OUTPATIENT)
Dept: PHARMACY | Facility: HOSPITAL | Age: 25
End: 2025-08-22
Payer: MEDICAID

## 2025-08-22 ENCOUNTER — PHARMACY VISIT (OUTPATIENT)
Dept: PHARMACY | Facility: CLINIC | Age: 25
End: 2025-08-22
Payer: MEDICAID

## 2025-08-22 DIAGNOSIS — Z71.3 WEIGHT LOSS COUNSELING, ENCOUNTER FOR: ICD-10-CM

## 2025-08-22 DIAGNOSIS — R63.5 WEIGHT GAIN, ABNORMAL: ICD-10-CM

## 2025-08-22 PROCEDURE — RXMED WILLOW AMBULATORY MEDICATION CHARGE

## 2025-08-22 RX ORDER — DULAGLUTIDE 0.75 MG/.5ML
0.75 INJECTION, SOLUTION SUBCUTANEOUS WEEKLY
Qty: 2 ML | Refills: 1 | Status: SHIPPED | OUTPATIENT
Start: 2025-08-22

## 2025-09-19 ENCOUNTER — APPOINTMENT (OUTPATIENT)
Dept: PHARMACY | Facility: HOSPITAL | Age: 25
End: 2025-09-19
Payer: MEDICAID

## 2026-01-19 ENCOUNTER — APPOINTMENT (OUTPATIENT)
Dept: PRIMARY CARE | Facility: CLINIC | Age: 26
End: 2026-01-19
Payer: MEDICAID